# Patient Record
Sex: FEMALE | Race: BLACK OR AFRICAN AMERICAN | Employment: OTHER | URBAN - METROPOLITAN AREA
[De-identification: names, ages, dates, MRNs, and addresses within clinical notes are randomized per-mention and may not be internally consistent; named-entity substitution may affect disease eponyms.]

---

## 2023-08-31 PROBLEM — I10 BENIGN ESSENTIAL HYPERTENSION: Status: ACTIVE | Noted: 2023-08-31

## 2023-08-31 PROBLEM — H04.123 DRY EYE SYNDROME, BILATERAL: Status: ACTIVE | Noted: 2023-08-31

## 2023-08-31 PROBLEM — H40.1131 PRIMARY OPEN-ANGLE GLAUCOMA, BILATERAL, MILD STAGE: Status: ACTIVE | Noted: 2023-08-31

## 2023-08-31 PROBLEM — M47.812 CERVICAL SPONDYLOSIS WITHOUT MYELOPATHY: Status: ACTIVE | Noted: 2023-08-31

## 2023-08-31 PROBLEM — J45.40 ASTHMA, MODERATE PERSISTENT (HHS-HCC): Status: ACTIVE | Noted: 2023-08-31

## 2023-08-31 PROBLEM — M79.18 MYOFASCIAL PAIN: Status: ACTIVE | Noted: 2023-08-31

## 2023-08-31 PROBLEM — R21 FACIAL RASH: Status: ACTIVE | Noted: 2023-08-31

## 2023-08-31 PROBLEM — L30.9 DERMATITIS, UNSPECIFIED: Status: ACTIVE | Noted: 2020-07-21

## 2023-08-31 PROBLEM — E78.5 HYPERLIPIDEMIA: Status: ACTIVE | Noted: 2023-08-31

## 2023-08-31 PROBLEM — H40.033 ANATOMICAL NARROW ANGLE GLAUCOMA, BILATERAL: Status: ACTIVE | Noted: 2023-08-31

## 2023-08-31 PROBLEM — D50.9 ANEMIA, IRON DEFICIENCY: Status: ACTIVE | Noted: 2023-08-31

## 2023-08-31 PROBLEM — H35.373 EPIRETINAL MEMBRANE (ERM), BILATERAL: Status: ACTIVE | Noted: 2023-08-31

## 2023-08-31 PROBLEM — L57.9 SKIN CHANGES DUE TO CHRONIC EXPOSURE TO NONIONIZING RADIATION, UNSPECIFIED: Status: ACTIVE | Noted: 2020-07-21

## 2023-08-31 PROBLEM — H25.13 CATARACT, NUCLEAR SCLEROTIC, BOTH EYES: Status: ACTIVE | Noted: 2023-08-31

## 2023-08-31 PROBLEM — M54.12 CERVICAL RADICULITIS: Status: ACTIVE | Noted: 2023-08-31

## 2023-08-31 PROBLEM — H43.393 VITREOUS OPACITIES OF BOTH EYES: Status: ACTIVE | Noted: 2023-08-31

## 2023-08-31 PROBLEM — L85.3 XEROSIS CUTIS: Status: ACTIVE | Noted: 2020-07-21

## 2023-08-31 RX ORDER — ALBUTEROL SULFATE 90 UG/1
1-2 AEROSOL, METERED RESPIRATORY (INHALATION)
COMMUNITY
Start: 2019-03-01

## 2023-08-31 RX ORDER — KETOCONAZOLE 20 MG/ML
SHAMPOO, SUSPENSION TOPICAL
COMMUNITY
Start: 2022-09-15

## 2023-08-31 RX ORDER — AMLODIPINE BESYLATE 2.5 MG/1
1 TABLET ORAL DAILY
COMMUNITY
Start: 2019-03-01

## 2023-08-31 RX ORDER — FLUOCINOLONE ACETONIDE 0.25 MG/G
OINTMENT TOPICAL 2 TIMES DAILY
COMMUNITY
Start: 2023-07-26

## 2023-08-31 RX ORDER — MINOXIDIL 2.5 MG/1
TABLET ORAL
COMMUNITY
Start: 2023-07-31

## 2023-08-31 RX ORDER — HYDROCODONE BITARTRATE AND ACETAMINOPHEN 5; 325 MG/1; MG/1
1 TABLET ORAL EVERY 6 HOURS PRN
COMMUNITY
Start: 2019-09-09 | End: 2023-11-13 | Stop reason: ALTCHOICE

## 2023-08-31 RX ORDER — IBUPROFEN 600 MG/1
1 TABLET ORAL 3 TIMES DAILY
COMMUNITY
Start: 2019-09-09

## 2023-08-31 RX ORDER — FLUTICASONE PROPIONATE 44 UG/1
2 AEROSOL, METERED RESPIRATORY (INHALATION) EVERY 12 HOURS
COMMUNITY
Start: 2019-03-01

## 2023-08-31 RX ORDER — CYCLOBENZAPRINE HCL 10 MG
1 TABLET ORAL NIGHTLY PRN
COMMUNITY
Start: 2019-09-09

## 2023-08-31 RX ORDER — FAMOTIDINE 20 MG/1
1 TABLET, FILM COATED ORAL DAILY
COMMUNITY
Start: 2021-09-10 | End: 2023-11-13 | Stop reason: ALTCHOICE

## 2023-08-31 RX ORDER — AMLODIPINE BESYLATE 5 MG/1
1 TABLET ORAL DAILY
COMMUNITY
Start: 2019-09-09 | End: 2023-11-13 | Stop reason: ALTCHOICE

## 2023-08-31 RX ORDER — POTASSIUM CHLORIDE 750 MG/1
1 TABLET, FILM COATED, EXTENDED RELEASE ORAL DAILY
COMMUNITY
Start: 2019-03-01 | End: 2023-11-13 | Stop reason: ALTCHOICE

## 2023-08-31 RX ORDER — LISINOPRIL AND HYDROCHLOROTHIAZIDE 20; 25 MG/1; MG/1
1 TABLET ORAL DAILY
COMMUNITY
Start: 2019-09-09

## 2023-08-31 RX ORDER — AMLODIPINE BESYLATE 10 MG/1
TABLET ORAL
COMMUNITY
Start: 2022-10-12 | End: 2023-11-13 | Stop reason: ALTCHOICE

## 2023-08-31 RX ORDER — LATANOPROST/PF 0.005 %
1 DROPS OPHTHALMIC (EYE) NIGHTLY
COMMUNITY
Start: 2020-01-28 | End: 2024-01-11 | Stop reason: SDUPTHER

## 2023-08-31 RX ORDER — DESONIDE 0.5 MG/G
CREAM TOPICAL 2 TIMES DAILY PRN
COMMUNITY
Start: 2020-12-22

## 2023-08-31 RX ORDER — THEOPHYLLINE 300 MG/1
1 TABLET, EXTENDED RELEASE ORAL DAILY
COMMUNITY
Start: 2019-03-01

## 2023-08-31 RX ORDER — PREDNISONE 20 MG/1
TABLET ORAL
COMMUNITY
Start: 2021-09-10 | End: 2023-11-13 | Stop reason: ALTCHOICE

## 2023-10-24 ENCOUNTER — OFFICE VISIT (OUTPATIENT)
Dept: OPHTHALMOLOGY | Facility: CLINIC | Age: 73
End: 2023-10-24
Payer: MEDICARE

## 2023-10-24 DIAGNOSIS — H25.13 CATARACT, NUCLEAR SCLEROTIC, BOTH EYES: Primary | ICD-10-CM

## 2023-10-24 DIAGNOSIS — H40.033 ANATOMICAL NARROW ANGLE GLAUCOMA, BILATERAL: ICD-10-CM

## 2023-10-24 PROCEDURE — 92136 OPHTHALMIC BIOMETRY: CPT | Mod: BILATERAL PROCEDURE | Performed by: OPHTHALMOLOGY

## 2023-10-24 PROCEDURE — 99214 OFFICE O/P EST MOD 30 MIN: CPT | Performed by: OPHTHALMOLOGY

## 2023-10-24 PROCEDURE — 92136 OPHTHALMIC BIOMETRY: CPT | Performed by: OPHTHALMOLOGY

## 2023-10-24 RX ORDER — ATORVASTATIN CALCIUM 20 MG/1
20 TABLET, FILM COATED ORAL DAILY
COMMUNITY

## 2023-10-24 RX ORDER — METHYLPREDNISOLONE 4 MG/1
4 TABLET ORAL DAILY
COMMUNITY
End: 2023-11-13 | Stop reason: ALTCHOICE

## 2023-10-24 RX ORDER — MOMETASONE FUROATE 220 UG/1
2 INHALANT RESPIRATORY (INHALATION)
COMMUNITY
End: 2023-11-13 | Stop reason: ALTCHOICE

## 2023-10-24 RX ORDER — HYDROCORTISONE 25 MG/G
1 CREAM TOPICAL 2 TIMES DAILY
COMMUNITY

## 2023-10-24 ASSESSMENT — TONOMETRY
IOP_METHOD: GOLDMANN APPLANATION
OD_IOP_MMHG: 25
OS_IOP_MMHG: 23

## 2023-10-24 ASSESSMENT — SLIT LAMP EXAM - LIDS
COMMENTS: NORMAL
COMMENTS: NORMAL

## 2023-10-24 ASSESSMENT — REFRACTION_WEARINGRX
OS_AXIS: 100
OD_SPHERE: -4.50
OD_CYLINDER: -1.00
OS_CYLINDER: -1.00
OS_SPHERE: -5.50
OD_ADD: +2.75
OD_AXIS: 096
OS_ADD: +2.75

## 2023-10-24 ASSESSMENT — CONF VISUAL FIELD
OD_SUPERIOR_TEMPORAL_RESTRICTION: 0
OS_SUPERIOR_TEMPORAL_RESTRICTION: 0
OD_NORMAL: 1
OD_INFERIOR_NASAL_RESTRICTION: 0
OS_SUPERIOR_NASAL_RESTRICTION: 0
OS_NORMAL: 1
METHOD: COUNTING FINGERS
OD_INFERIOR_TEMPORAL_RESTRICTION: 0
OD_SUPERIOR_NASAL_RESTRICTION: 0
OS_INFERIOR_TEMPORAL_RESTRICTION: 0
OS_INFERIOR_NASAL_RESTRICTION: 0

## 2023-10-24 ASSESSMENT — VISUAL ACUITY
METHOD: SNELLEN - LINEAR
OD_CC: 20/25-2
CORRECTION_TYPE: GLASSES
OS_CC: 20/60

## 2023-10-24 ASSESSMENT — ENCOUNTER SYMPTOMS: EYES NEGATIVE: 1

## 2023-10-24 ASSESSMENT — PACHYMETRY
OD_CT(UM): 588
OS_CT(UM): 576

## 2023-10-24 ASSESSMENT — EXTERNAL EXAM - RIGHT EYE: OD_EXAM: NORMAL

## 2023-10-24 ASSESSMENT — CUP TO DISC RATIO
OD_RATIO: 0.5
OS_RATIO: 0.75

## 2023-10-24 ASSESSMENT — EXTERNAL EXAM - LEFT EYE: OS_EXAM: NORMAL

## 2023-10-24 NOTE — PROGRESS NOTES
1.. chronic angle closure glaucoma, mild stage, bilateral  - s/p LPI OU  -nerves with some cupping but no definitive VF defect OU  IOP higher today while on latan qhs oU  given below recommend combining with canaloplasty to reduce drop burden/improved IOP control    2. Cataract, nuclear sclerotic, both eyes  74 yo patient presents with visually significant cataract of left>right eye affecting activities of daily living including using her firearm. It is believed removal of the cataracts will improve vision adn thus qualuty of life. After discussing r/b/a to surgery the patient elected to proceed left eye first hten right. Lens options were discussed including pros/cons/and eligibility for monofocal, toric, multifocal, and toric multifocal lenses and patient elected to proceed with monofocal. patient`s current mrx is myopic. target after surgery will be plano. The patient was told to continue all medications through surgery.  anesthesia will planned to be mac with topical. will combine with abic

## 2023-10-30 ENCOUNTER — PREP FOR PROCEDURE (OUTPATIENT)
Dept: OPHTHALMOLOGY | Facility: CLINIC | Age: 73
End: 2023-10-30
Payer: MEDICARE

## 2023-10-30 DIAGNOSIS — H40.2231 CHRONIC ANGLE-CLOSURE GLAUCOMA OF BOTH EYES, MILD STAGE: ICD-10-CM

## 2023-10-30 DIAGNOSIS — H25.13 AGE-RELATED NUCLEAR CATARACT OF BOTH EYES: Primary | ICD-10-CM

## 2023-10-30 RX ORDER — TROPICAMIDE 10 MG/ML
1 SOLUTION/ DROPS OPHTHALMIC
Status: CANCELLED | OUTPATIENT
Start: 2023-10-30 | End: 2023-10-30

## 2023-10-30 RX ORDER — MOXIFLOXACIN 5 MG/ML
1 SOLUTION/ DROPS OPHTHALMIC
Status: CANCELLED | OUTPATIENT
Start: 2023-10-30 | End: 2023-10-30

## 2023-10-30 RX ORDER — CYCLOPENTOLATE HYDROCHLORIDE 10 MG/ML
1 SOLUTION/ DROPS OPHTHALMIC
Status: CANCELLED | OUTPATIENT
Start: 2023-10-30 | End: 2023-10-30

## 2023-10-30 RX ORDER — PHENYLEPHRINE HYDROCHLORIDE 25 MG/ML
1 SOLUTION/ DROPS OPHTHALMIC
Status: CANCELLED | OUTPATIENT
Start: 2023-10-30 | End: 2023-10-30

## 2023-10-30 NOTE — H&P (VIEW-ONLY)
History Of Present Illness  Clarissa Boo is a 73 y.o. female presenting for surgery.     Past Medical History  She has a past medical history of Cataract and Glaucoma.    Surgical History  She has a past surgical history that includes Other surgical history (03/01/2019) and Other surgical history (03/01/2019).     Social History  She reports that she has never smoked. She does not have any smokeless tobacco history on file. No history on file for alcohol use and drug use.     Allergies  Aspirin    ROS  Patient denies ocular pain, redness, discharge, decreased vision, double vision, blind spots, flashes, or floaters.     Physical Exam  Not recorded          Assessment/Plan   See clinic notes for details of plan      Ani Sheehan MD

## 2023-10-31 ENCOUNTER — ANCILLARY PROCEDURE (OUTPATIENT)
Dept: RADIOLOGY | Facility: CLINIC | Age: 73
End: 2023-10-31
Payer: OTHER MISCELLANEOUS

## 2023-10-31 ENCOUNTER — APPOINTMENT (OUTPATIENT)
Dept: RADIOLOGY | Facility: CLINIC | Age: 73
End: 2023-10-31
Payer: COMMERCIAL

## 2023-10-31 DIAGNOSIS — S93.402A SPRAIN OF UNSPECIFIED LIGAMENT OF LEFT ANKLE, INITIAL ENCOUNTER: ICD-10-CM

## 2023-10-31 PROCEDURE — 73721 MRI JNT OF LWR EXTRE W/O DYE: CPT | Mod: LEFT SIDE | Performed by: RADIOLOGY

## 2023-10-31 PROCEDURE — 73721 MRI JNT OF LWR EXTRE W/O DYE: CPT | Mod: LT,MG

## 2023-11-27 ENCOUNTER — ANESTHESIA EVENT (OUTPATIENT)
Dept: OPERATING ROOM | Facility: CLINIC | Age: 73
End: 2023-11-27
Payer: MEDICARE

## 2023-11-27 ENCOUNTER — HOSPITAL ENCOUNTER (OUTPATIENT)
Facility: CLINIC | Age: 73
Setting detail: OUTPATIENT SURGERY
Discharge: HOME | End: 2023-11-27
Attending: OPHTHALMOLOGY | Admitting: OPHTHALMOLOGY
Payer: MEDICARE

## 2023-11-27 ENCOUNTER — ANESTHESIA (OUTPATIENT)
Dept: OPERATING ROOM | Facility: CLINIC | Age: 73
End: 2023-11-27
Payer: MEDICARE

## 2023-11-27 VITALS
TEMPERATURE: 96.8 F | SYSTOLIC BLOOD PRESSURE: 155 MMHG | BODY MASS INDEX: 28.6 KG/M2 | RESPIRATION RATE: 17 BRPM | HEIGHT: 64 IN | OXYGEN SATURATION: 100 % | DIASTOLIC BLOOD PRESSURE: 80 MMHG | HEART RATE: 80 BPM | WEIGHT: 167.55 LBS

## 2023-11-27 DIAGNOSIS — H25.13 AGE-RELATED NUCLEAR CATARACT OF BOTH EYES: Primary | ICD-10-CM

## 2023-11-27 DIAGNOSIS — H40.2231 CHRONIC ANGLE-CLOSURE GLAUCOMA OF BOTH EYES, MILD STAGE: ICD-10-CM

## 2023-11-27 PROCEDURE — 94760 N-INVAS EAR/PLS OXIMETRY 1: CPT | Mod: CCI

## 2023-11-27 PROCEDURE — 3600000008 HC OR TIME - EACH INCREMENTAL 1 MINUTE - PROCEDURE LEVEL THREE: Performed by: OPHTHALMOLOGY

## 2023-11-27 PROCEDURE — 99100 ANES PT EXTEME AGE<1 YR&>70: CPT | Performed by: ANESTHESIOLOGY

## 2023-11-27 PROCEDURE — A65820 PR RELIEVE INNER EYE PRESSURE: Performed by: ANESTHESIOLOGY

## 2023-11-27 PROCEDURE — 3700000002 HC GENERAL ANESTHESIA TIME - EACH INCREMENTAL 1 MINUTE: Performed by: OPHTHALMOLOGY

## 2023-11-27 PROCEDURE — 2500000004 HC RX 250 GENERAL PHARMACY W/ HCPCS (ALT 636 FOR OP/ED)

## 2023-11-27 PROCEDURE — A65820 PR RELIEVE INNER EYE PRESSURE

## 2023-11-27 PROCEDURE — 7100000010 HC PHASE TWO TIME - EACH INCREMENTAL 1 MINUTE: Performed by: OPHTHALMOLOGY

## 2023-11-27 PROCEDURE — 65820 GONIOTOMY: CPT | Performed by: OPHTHALMOLOGY

## 2023-11-27 PROCEDURE — 66984 XCAPSL CTRC RMVL W/O ECP: CPT | Performed by: OPHTHALMOLOGY

## 2023-11-27 PROCEDURE — 2500000005 HC RX 250 GENERAL PHARMACY W/O HCPCS: Performed by: OPHTHALMOLOGY

## 2023-11-27 PROCEDURE — 3700000001 HC GENERAL ANESTHESIA TIME - INITIAL BASE CHARGE: Performed by: OPHTHALMOLOGY

## 2023-11-27 PROCEDURE — 2500000001 HC RX 250 WO HCPCS SELF ADMINISTERED DRUGS (ALT 637 FOR MEDICARE OP): Performed by: OPHTHALMOLOGY

## 2023-11-27 PROCEDURE — 2500000004 HC RX 250 GENERAL PHARMACY W/ HCPCS (ALT 636 FOR OP/ED): Performed by: OPHTHALMOLOGY

## 2023-11-27 PROCEDURE — 3600000003 HC OR TIME - INITIAL BASE CHARGE - PROCEDURE LEVEL THREE: Performed by: OPHTHALMOLOGY

## 2023-11-27 PROCEDURE — 7100000009 HC PHASE TWO TIME - INITIAL BASE CHARGE: Performed by: OPHTHALMOLOGY

## 2023-11-27 PROCEDURE — 2760000001 HC OR 276 NO HCPCS: Performed by: OPHTHALMOLOGY

## 2023-11-27 PROCEDURE — 2720000007 HC OR 272 NO HCPCS: Performed by: OPHTHALMOLOGY

## 2023-11-27 PROCEDURE — 2500000001 HC RX 250 WO HCPCS SELF ADMINISTERED DRUGS (ALT 637 FOR MEDICARE OP)

## 2023-11-27 DEVICE — CANALOPLASTY MICROCATHETER KITKIT CONTENTS:(1) CANALOPLASTY MICROCATHETER ITRACK 250A(1) OPTHALMIC VISCOINJECTOR
Type: IMPLANTABLE DEVICE | Site: EYE | Status: FUNCTIONAL
Brand: ITRACK 250A

## 2023-11-27 DEVICE — ACRYSOF(R) IQ ASPHERIC NATURAL IOL, SINGLE-PIECE ACRYLIC FOLDABLE PCL, UV WITH BLUE LIGHTFILTER, 13.0MM LENGTH, 6.0MM ANTERIORASYMMETRIC BICONVEX OPTIC, PLANAR HAPTICS.
Type: IMPLANTABLE DEVICE | Site: EYE | Status: FUNCTIONAL
Brand: ACRYSOF®

## 2023-11-27 RX ORDER — LIDOCAINE IN NACL,ISO-OSMOT/PF 30 MG/3 ML
0.1 SYRINGE (ML) INJECTION ONCE
Status: DISCONTINUED | OUTPATIENT
Start: 2023-11-27 | End: 2023-11-27 | Stop reason: HOSPADM

## 2023-11-27 RX ORDER — ALBUTEROL SULFATE 0.83 MG/ML
2.5 SOLUTION RESPIRATORY (INHALATION) ONCE AS NEEDED
Status: DISCONTINUED | OUTPATIENT
Start: 2023-11-27 | End: 2023-11-27 | Stop reason: HOSPADM

## 2023-11-27 RX ORDER — DICLOFENAC SODIUM 1 MG/ML
SOLUTION/ DROPS OPHTHALMIC AS NEEDED
Status: DISCONTINUED | OUTPATIENT
Start: 2023-11-27 | End: 2023-11-27 | Stop reason: HOSPADM

## 2023-11-27 RX ORDER — PREDNISOLONE ACETATE 10 MG/ML
SUSPENSION/ DROPS OPHTHALMIC AS NEEDED
Status: DISCONTINUED | OUTPATIENT
Start: 2023-11-27 | End: 2023-11-27 | Stop reason: HOSPADM

## 2023-11-27 RX ORDER — ACETAMINOPHEN 325 MG/1
TABLET ORAL AS NEEDED
Status: DISCONTINUED | OUTPATIENT
Start: 2023-11-27 | End: 2023-11-27

## 2023-11-27 RX ORDER — TETRACAINE HYDROCHLORIDE 5 MG/ML
SOLUTION OPHTHALMIC AS NEEDED
Status: DISCONTINUED | OUTPATIENT
Start: 2023-11-27 | End: 2023-11-27 | Stop reason: HOSPADM

## 2023-11-27 RX ORDER — PHENYLEPHRINE HYDROCHLORIDE 25 MG/ML
1 SOLUTION/ DROPS OPHTHALMIC
Status: COMPLETED | OUTPATIENT
Start: 2023-11-27 | End: 2023-11-27

## 2023-11-27 RX ORDER — SODIUM CHLORIDE, SODIUM LACTATE, POTASSIUM CHLORIDE, CALCIUM CHLORIDE 600; 310; 30; 20 MG/100ML; MG/100ML; MG/100ML; MG/100ML
100 INJECTION, SOLUTION INTRAVENOUS CONTINUOUS
Status: DISCONTINUED | OUTPATIENT
Start: 2023-11-27 | End: 2023-11-27 | Stop reason: HOSPADM

## 2023-11-27 RX ORDER — MOXIFLOXACIN 5 MG/ML
SOLUTION/ DROPS OPHTHALMIC AS NEEDED
Status: DISCONTINUED | OUTPATIENT
Start: 2023-11-27 | End: 2023-11-27 | Stop reason: HOSPADM

## 2023-11-27 RX ORDER — MIDAZOLAM HYDROCHLORIDE 1 MG/ML
INJECTION, SOLUTION INTRAMUSCULAR; INTRAVENOUS AS NEEDED
Status: DISCONTINUED | OUTPATIENT
Start: 2023-11-27 | End: 2023-11-27

## 2023-11-27 RX ORDER — CYCLOPENTOLATE HYDROCHLORIDE 10 MG/ML
1 SOLUTION/ DROPS OPHTHALMIC
Status: COMPLETED | OUTPATIENT
Start: 2023-11-27 | End: 2023-11-27

## 2023-11-27 RX ORDER — ONDANSETRON HYDROCHLORIDE 2 MG/ML
4 INJECTION, SOLUTION INTRAVENOUS ONCE AS NEEDED
Status: DISCONTINUED | OUTPATIENT
Start: 2023-11-27 | End: 2023-11-27 | Stop reason: HOSPADM

## 2023-11-27 RX ORDER — EPINEPHRINE 1 MG/ML
INJECTION, SOLUTION, CONCENTRATE INTRAVENOUS AS NEEDED
Status: DISCONTINUED | OUTPATIENT
Start: 2023-11-27 | End: 2023-11-27 | Stop reason: HOSPADM

## 2023-11-27 RX ORDER — MOXIFLOXACIN 5 MG/ML
1 SOLUTION/ DROPS OPHTHALMIC
Status: COMPLETED | OUTPATIENT
Start: 2023-11-27 | End: 2023-11-27

## 2023-11-27 RX ORDER — FENTANYL CITRATE 50 UG/ML
INJECTION, SOLUTION INTRAMUSCULAR; INTRAVENOUS AS NEEDED
Status: DISCONTINUED | OUTPATIENT
Start: 2023-11-27 | End: 2023-11-27

## 2023-11-27 RX ORDER — TROPICAMIDE 10 MG/ML
1 SOLUTION/ DROPS OPHTHALMIC
Status: COMPLETED | OUTPATIENT
Start: 2023-11-27 | End: 2023-11-27

## 2023-11-27 RX ORDER — LIDOCAINE HYDROCHLORIDE 20 MG/ML
INJECTION, SOLUTION INFILTRATION; PERINEURAL AS NEEDED
Status: DISCONTINUED | OUTPATIENT
Start: 2023-11-27 | End: 2023-11-27 | Stop reason: HOSPADM

## 2023-11-27 RX ADMIN — CYCLOPENTOLATE HYDROCHLORIDE 1 DROP: 10 SOLUTION/ DROPS OPHTHALMIC at 10:35

## 2023-11-27 RX ADMIN — TROPICAMIDE 1 DROP: 10 SOLUTION/ DROPS OPHTHALMIC at 10:30

## 2023-11-27 RX ADMIN — FENTANYL CITRATE 25 MCG: 50 INJECTION, SOLUTION INTRAMUSCULAR; INTRAVENOUS at 11:05

## 2023-11-27 RX ADMIN — TROPICAMIDE 1 DROP: 10 SOLUTION/ DROPS OPHTHALMIC at 10:35

## 2023-11-27 RX ADMIN — CYCLOPENTOLATE HYDROCHLORIDE 1 DROP: 10 SOLUTION/ DROPS OPHTHALMIC at 10:40

## 2023-11-27 RX ADMIN — TROPICAMIDE 1 DROP: 10 SOLUTION/ DROPS OPHTHALMIC at 10:40

## 2023-11-27 RX ADMIN — CYCLOPENTOLATE HYDROCHLORIDE 1 DROP: 10 SOLUTION/ DROPS OPHTHALMIC at 10:30

## 2023-11-27 RX ADMIN — MOXIFLOXACIN 1 DROP: 5 SOLUTION/ DROPS OPHTHALMIC at 10:40

## 2023-11-27 RX ADMIN — PHENYLEPHRINE HYDROCHLORIDE 1 DROP: 25 SOLUTION/ DROPS OPHTHALMIC at 10:30

## 2023-11-27 RX ADMIN — MOXIFLOXACIN 1 DROP: 5 SOLUTION/ DROPS OPHTHALMIC at 10:30

## 2023-11-27 RX ADMIN — PHENYLEPHRINE HYDROCHLORIDE 1 DROP: 25 SOLUTION/ DROPS OPHTHALMIC at 10:40

## 2023-11-27 RX ADMIN — ACETAMINOPHEN 975 MG: 325 TABLET ORAL at 10:30

## 2023-11-27 RX ADMIN — SODIUM CHLORIDE, SODIUM LACTATE, POTASSIUM CHLORIDE, AND CALCIUM CHLORIDE: .6; .31; .03; .02 INJECTION, SOLUTION INTRAVENOUS at 11:03

## 2023-11-27 RX ADMIN — MOXIFLOXACIN 1 DROP: 5 SOLUTION/ DROPS OPHTHALMIC at 10:35

## 2023-11-27 RX ADMIN — PHENYLEPHRINE HYDROCHLORIDE 1 DROP: 25 SOLUTION/ DROPS OPHTHALMIC at 10:35

## 2023-11-27 RX ADMIN — MIDAZOLAM 1 MG: 1 INJECTION INTRAMUSCULAR; INTRAVENOUS at 11:05

## 2023-11-27 SDOH — HEALTH STABILITY: MENTAL HEALTH: CURRENT SMOKER: 0

## 2023-11-27 ASSESSMENT — PAIN SCALES - GENERAL
PAIN_LEVEL: 0
PAINLEVEL_OUTOF10: 1
PAINLEVEL_OUTOF10: 0 - NO PAIN

## 2023-11-27 ASSESSMENT — PAIN - FUNCTIONAL ASSESSMENT
PAIN_FUNCTIONAL_ASSESSMENT: 0-10

## 2023-11-27 ASSESSMENT — COLUMBIA-SUICIDE SEVERITY RATING SCALE - C-SSRS
2. HAVE YOU ACTUALLY HAD ANY THOUGHTS OF KILLING YOURSELF?: NO
1. IN THE PAST MONTH, HAVE YOU WISHED YOU WERE DEAD OR WISHED YOU COULD GO TO SLEEP AND NOT WAKE UP?: NO
6. HAVE YOU EVER DONE ANYTHING, STARTED TO DO ANYTHING, OR PREPARED TO DO ANYTHING TO END YOUR LIFE?: NO

## 2023-11-27 ASSESSMENT — PAIN DESCRIPTION - DESCRIPTORS: DESCRIPTORS: OTHER (COMMENT)

## 2023-11-27 NOTE — DISCHARGE INSTRUCTIONS
May have Tylenol after: 4:30 PM    May have Ibuprofen/advil/motrin/aleve after: when MD approves     Do not use Glaucoma drops in left eye until directed to by Dr Sheehan

## 2023-11-27 NOTE — ANESTHESIA PREPROCEDURE EVALUATION
Patient: Clarissa Boo    Procedure Information       Date/Time: 11/27/23 1245    Procedure: PHACO OS, GONIOTOMY (Left)    Location: [facility] OR 03 / [facility]    Surgeons: Ani Sheehan MD            Relevant Problems   Anesthesia (within normal limits)      Cardiovascular   (+) Benign essential hypertension   (+) Hyperlipidemia      Endocrine (within normal limits)      GI (within normal limits)      /Renal (within normal limits)      Neuro/Psych   (+) Cervical radiculitis      Pulmonary   (+) Asthma, moderate persistent      GI/Hepatic (within normal limits)      Hematology   (+) Anemia, iron deficiency      Musculoskeletal   (+) Cervical spondylosis without myelopathy      Eyes, Ears, Nose, and Throat   (+) Anatomical narrow angle glaucoma, bilateral   (+) Chronic angle-closure glaucoma of both eyes, mild stage   (+) Primary open-angle glaucoma, bilateral, mild stage       Clinical information reviewed:    Allergies  Meds               NPO Detail:  NPO/Void Status  Date of Last Liquid: 11/26/23  Time of Last Liquid: 1800  Date of Last Solid: 11/26/23  Time of Last Solid: 1800         Physical Exam    Airway  Mallampati: II  TM distance: >3 FB  Neck ROM: full     Cardiovascular - normal exam     Dental - normal exam  (+) upper dentures, lower dentures     Pulmonary - normal exam     Abdominal - normal exam             Anesthesia Plan    ASA 2     MAC     The patient is not a current smoker.  Patient was not previously instructed to abstain from smoking on day of procedure.  Patient did not smoke on day of procedure.    intravenous induction   Anesthetic plan and risks discussed with patient.  Use of blood products discussed with patient who.    Plan discussed with CAA, attending and resident.

## 2023-11-27 NOTE — OP NOTE
Cataract Phacoemulsification w/I0L (L) Operative Note     Date: 2023  [place] [facility] [place]    Name: Clarissa Boo : 1950, Age: 73 y.o., MRN: [id number], Sex: female    Diagnosis  Pre-op Diagnosis     * Age-related nuclear cataract of both eyes [H25.13]     * Chronic angle-closure glaucoma of both eyes, mild stage [H40.2231] Post-op Diagnosis     * Age-related nuclear cataract of both eyes [H25.13]     * Chronic angle-closure glaucoma of both eyes, mild stage [H40.2231]     Procedures  PHACO OS, GONIOTOMY  26366 - [place] XCAPSL CTRC RMVL INSJ IO LENS PROSTH W/O ECP    PHACO OS, GONIOTOMY  56971 - [place] GONIOTOMY  , LEFT    Surgeons      * Ani Sheehan - Primary    Resident/Fellow/Other Assistant:  Kristen    Procedure Summary  Anesthesia: Monitor Anesthesia Care  ASA: II  Anesthesia Staff:   Anesthesiologist: Katlyn Price DO  C-AA: KENNY Stewart  Estimated Blood Loss: none        Specimen: No specimens collected     Staff:   Circulator: Rachel Sampson RN  Relief Scrub: Mansi Hill RN  Scrub Person: Jennifer Cornejo          Findings: as expected    Indications: Clarissa Boo is an 73 y.o. female who is having surgery for Age-related nuclear cataract of both eyes [H25.13]  Chronic angle-closure glaucoma of both eyes, mild stage [H40.2231]. LEFT    The patient was seen in the preoperative area. The risks, benefits, complications, treatment options, non-operative alternatives, expected recovery and outcomes were discussed with the patient. The possibilities of reaction to medication, pulmonary aspiration, injury to surrounding structures, bleeding, recurrent infection, the need for additional procedures, failure to diagnose a condition, and creating a complication or operation were discussed with the patient. The patient concurred with the proposed plan, giving informed consent.  The site of surgery was properly noted/marked if necessary per policy.     Procedure  Details:   The patient was escorted to the operating room where a time out was completed   and monitored anesthesia care was begun. The patient was prepped and draped in   the usual sterile fashion for intraocular surgery. A lid speculum was placed   and the operating microscope was positioned. A paracentesis was made to the   left of the planned cataract incision with a 1mm blade.  Shugarcaine followed by Viscoat was used to   replace the aqueous humor. A temporal clear corneal wound was made with a 2.4   mm keratome, extending 2 mm into clear cornea before entering the anterior   chamber. A continuous curvilinear  capsulorhexis of approximately 5 mm in   diameter was performed. Hydrodissection was performed using a canula. The   pre-chopper was used to crack the nucleus into smaller pieces which were then   removed with the assistance of a horizontal chopper and phaco hand piece.   Residual cortex was removed with IA. ProVisc was used to inflate the capsular   bag. The lens implant  alex [id number] 13.5 diopter intraocular lens. The lens was   inserted into the capsular bag and rotated to the proper position with a   juno. The patient was then positioned for direct gonioscopy. A hour 3 clock hour nasal goniotomy was made.   The microcatheter was primed and then thread for 360 degrees and then retracted whilst   injecting viscoelastic approximately 8-10 clock hours per quadrant. the device   was then removed an dthe patietn was repositioned supine. Residual   viscoelastic was removed from the eye with the irrigation/aspiration   instrument. The wounds were checked and found to be watertight. The anterior   chamber was at physiologic depth and pressure. The lid speculum was removed and   a patch and shield were taped in place. The patient tolerated the procedure   well, and there were no complications.   Complications:  None   Disposition: stable          Attending Attestation: I was present and scrubbed for the  entire procedure    Ani Sheehan  Phone Number: [phone]

## 2023-11-27 NOTE — ANESTHESIA POSTPROCEDURE EVALUATION
Patient: Clarissa Boo    Procedure Summary       Date: 11/27/23 Room / Location: [facility] OR 03 / [facility]    Anesthesia Start: 1103 Anesthesia Stop: 1155    Procedure: PHACO OS, GONIOTOMY (Left) Diagnosis:       Age-related nuclear cataract of both eyes      Chronic angle-closure glaucoma of both eyes, mild stage      (Age-related nuclear cataract of both eyes [H25.13])      (Chronic angle-closure glaucoma of both eyes, mild stage [H40.2231])    Surgeons: Ani Sheehan MD Responsible Provider: Katlyn Price DO    Anesthesia Type: MAC ASA Status: 2            Anesthesia Type: MAC    Vitals Value Taken Time   /82 11/27/23 1155   Temp 36.5 11/27/23 1155   Pulse 70 11/27/23 1155   Resp 14 11/27/23 1155   SpO2 98 11/27/23 1155       Anesthesia Post Evaluation    Patient location during evaluation: PACU  Patient participation: complete - patient participated  Level of consciousness: awake  Pain score: 0  Pain management: adequate  Multimodal analgesia pain management approach  Airway patency: patent  There was medical reason for not screening for obstructive sleep apnea and/or not using of two or more mitigation strategies.Cardiovascular status: acceptable, hemodynamically stable and blood pressure returned to baseline  Respiratory status: acceptable and room air  Hydration status: acceptable  Postoperative Nausea and Vomiting: none      No notable events documented.

## 2023-11-28 ENCOUNTER — OFFICE VISIT (OUTPATIENT)
Dept: OPHTHALMOLOGY | Facility: CLINIC | Age: 73
End: 2023-11-28
Payer: MEDICARE

## 2023-11-28 DIAGNOSIS — H40.033 ANATOMICAL NARROW ANGLE GLAUCOMA, BILATERAL: ICD-10-CM

## 2023-11-28 DIAGNOSIS — H25.13 CATARACT, NUCLEAR SCLEROTIC, BOTH EYES: Primary | ICD-10-CM

## 2023-11-28 PROCEDURE — 99024 POSTOP FOLLOW-UP VISIT: CPT | Performed by: OPHTHALMOLOGY

## 2023-11-28 ASSESSMENT — SLIT LAMP EXAM - LIDS
COMMENTS: NORMAL
COMMENTS: NORMAL

## 2023-11-28 ASSESSMENT — ENCOUNTER SYMPTOMS
EYES NEGATIVE: 0
CARDIOVASCULAR NEGATIVE: 0
RESPIRATORY NEGATIVE: 0
NEUROLOGICAL NEGATIVE: 0
ENDOCRINE NEGATIVE: 0
PSYCHIATRIC NEGATIVE: 0
ALLERGIC/IMMUNOLOGIC NEGATIVE: 0
HEMATOLOGIC/LYMPHATIC NEGATIVE: 0
CONSTITUTIONAL NEGATIVE: 0
MUSCULOSKELETAL NEGATIVE: 0
GASTROINTESTINAL NEGATIVE: 0

## 2023-11-28 ASSESSMENT — VISUAL ACUITY
METHOD: SNELLEN - LINEAR
OS_SC: 20/70
OS_SC+: +2

## 2023-11-28 ASSESSMENT — PACHYMETRY
OD_CT(UM): 588
OS_CT(UM): 576

## 2023-11-28 ASSESSMENT — EXTERNAL EXAM - RIGHT EYE: OD_EXAM: NORMAL

## 2023-11-28 ASSESSMENT — PAIN SCALES - GENERAL: PAINLEVEL_OUTOF10: 0 - NO PAIN

## 2023-11-28 ASSESSMENT — TONOMETRY
OS_IOP_MMHG: 15
IOP_METHOD: GOLDMANN APPLANATION

## 2023-11-28 ASSESSMENT — EXTERNAL EXAM - LEFT EYE: OS_EXAM: NORMAL

## 2023-11-28 NOTE — PROGRESS NOTES
POD1 s/p ce/iol/abic, left   doing well.   Continue pred/dicl/moxi QID  Discontinue glaucoma drops  shield and activity restrictions.  RTC 1 week, sooner prn       1.. chronic angle closure glaucoma, mild stage, bilateral  - s/p LPI OU  -nerves with some cupping but no definitive VF defect OU  IOP higher today while on latan qhs oU  given below recommend combining with canaloplasty to reduce drop burden/improved IOP control    2. Cataract, nuclear sclerotic, both eyes  72 yo patient presents with visually significant cataract of left>right eye affecting activities of daily living including using her firearm. It is believed removal of the cataracts will improve vision adn thus qualuty of life. After discussing r/b/a to surgery the patient elected to proceed left eye first hten right. Lens options were discussed including pros/cons/and eligibility for monofocal, toric, multifocal, and toric multifocal lenses and patient elected to proceed with monofocal. patient`s current mrx is myopic. target after surgery will be plano. The patient was told to continue all medications through surgery.  anesthesia will planned to be mac with topical. will combine with abic

## 2023-11-28 NOTE — INTERVAL H&P NOTE
H&P reviewed. The patient was examined and there are no changes to the H&P.       Review of Systems   Constitutional: Negative.    All other systems reviewed and are negative.       Physical Exam  Vitals reviewed.   Constitutional:       Appearance: Normal appearance.   HENT:      Head: Normocephalic and atraumatic.      Mouth/Throat:      Pharynx: Oropharynx is clear.   Eyes:      Conjunctiva/sclera: Conjunctivae normal.   Abdominal:      General: Abdomen is flat.   Musculoskeletal:         General: Normal range of motion.      Cervical back: Normal range of motion.   Skin:     General: Skin is warm.   Neurological:      General: No focal deficit present.      Mental Status: She is alert and oriented to person, place, and time.   Psychiatric:         Mood and Affect: Mood normal.         Behavior: Behavior normal.

## 2023-12-07 ENCOUNTER — OFFICE VISIT (OUTPATIENT)
Dept: OPHTHALMOLOGY | Facility: CLINIC | Age: 73
End: 2023-12-07
Payer: MEDICARE

## 2023-12-07 DIAGNOSIS — Z96.1 PSEUDOPHAKIA: Primary | ICD-10-CM

## 2023-12-07 PROCEDURE — 99024 POSTOP FOLLOW-UP VISIT: CPT | Performed by: OPHTHALMOLOGY

## 2023-12-07 ASSESSMENT — VISUAL ACUITY
METHOD: SNELLEN - LINEAR
OS_SC: 20/30
OS_SC+: -1

## 2023-12-07 ASSESSMENT — PACHYMETRY
OD_CT(UM): 588
OS_CT(UM): 576

## 2023-12-07 ASSESSMENT — EXTERNAL EXAM - RIGHT EYE: OD_EXAM: NORMAL

## 2023-12-07 ASSESSMENT — TONOMETRY
OS_IOP_MMHG: 13
IOP_METHOD: GOLDMANN APPLANATION

## 2023-12-07 ASSESSMENT — SLIT LAMP EXAM - LIDS
COMMENTS: NORMAL
COMMENTS: NORMAL

## 2023-12-07 ASSESSMENT — EXTERNAL EXAM - LEFT EYE: OS_EXAM: NORMAL

## 2023-12-07 NOTE — PROGRESS NOTES
POw1 s/p ce/iol/abic, left   doing well.   Taper  pred/  Stop moxi QID  Discontinue glaucoma drops, continue od  discontinue shield and activity restrictions.  Ok to proceed with OD      1.. chronic angle closure glaucoma, mild stage, bilateral  - s/p LPI OU  -nerves with some cupping but no definitive VF defect OU  IOP higher today while on latan qhs oU  given below recommend combining with canaloplasty to reduce drop burden/improved IOP control    2. Cataract, nuclear sclerotic, both eyes  74 yo patient presents with visually significant cataract of left>right eye affecting activities of daily living including using her firearm. It is believed removal of the cataracts will improve vision adn thus qualuty of life. After discussing r/b/a to surgery the patient elected to proceed left eye first hten right. Lens options were discussed including pros/cons/and eligibility for monofocal, toric, multifocal, and toric multifocal lenses and patient elected to proceed with monofocal. patient`s current mrx is myopic. target after surgery will be plano. The patient was told to continue all medications through surgery.  anesthesia will planned to be mac with topical. will combine with abic

## 2023-12-12 ENCOUNTER — OFFICE VISIT (OUTPATIENT)
Dept: OPHTHALMOLOGY | Facility: CLINIC | Age: 73
End: 2023-12-12
Payer: MEDICARE

## 2023-12-12 DIAGNOSIS — Z96.1 PSEUDOPHAKIA: Primary | ICD-10-CM

## 2023-12-12 DIAGNOSIS — H20.00 ACUTE IRITIS: ICD-10-CM

## 2023-12-12 PROCEDURE — 99024 POSTOP FOLLOW-UP VISIT: CPT | Performed by: OPHTHALMOLOGY

## 2023-12-12 RX ORDER — PREDNISOLONE ACETATE 10 MG/ML
1 SUSPENSION/ DROPS OPHTHALMIC 4 TIMES DAILY
Qty: 5 ML | Refills: 0 | Status: SHIPPED | OUTPATIENT
Start: 2023-12-12 | End: 2023-12-26

## 2023-12-12 ASSESSMENT — CONF VISUAL FIELD
OS_NORMAL: 1
OD_SUPERIOR_TEMPORAL_RESTRICTION: 0
OD_NORMAL: 1
OD_INFERIOR_TEMPORAL_RESTRICTION: 0
OS_INFERIOR_NASAL_RESTRICTION: 0
OS_SUPERIOR_TEMPORAL_RESTRICTION: 0
OS_INFERIOR_TEMPORAL_RESTRICTION: 0
OS_SUPERIOR_NASAL_RESTRICTION: 0
OD_INFERIOR_NASAL_RESTRICTION: 0
OD_SUPERIOR_NASAL_RESTRICTION: 0

## 2023-12-12 ASSESSMENT — PACHYMETRY
OD_CT(UM): 588
OS_CT(UM): 576

## 2023-12-12 ASSESSMENT — TONOMETRY
OD_IOP_MMHG: 20
IOP_METHOD: TONOPEN
OS_IOP_MMHG: 17

## 2023-12-12 ASSESSMENT — EXTERNAL EXAM - RIGHT EYE: OD_EXAM: NORMAL

## 2023-12-12 ASSESSMENT — EXTERNAL EXAM - LEFT EYE: OS_EXAM: NORMAL

## 2023-12-12 ASSESSMENT — CUP TO DISC RATIO: OS_RATIO: 0.75

## 2023-12-12 ASSESSMENT — SLIT LAMP EXAM - LIDS
COMMENTS: NORMAL
COMMENTS: NORMAL

## 2023-12-12 ASSESSMENT — VISUAL ACUITY
CORRECTION_TYPE: GLASSES
METHOD: SNELLEN - LINEAR
OD_CC: 20/25

## 2023-12-13 NOTE — PROGRESS NOTES
Patient seen in triage clinic for concern of left eye redness and irritation.    HPI: 72 y/o with history of mild chronic angle closure glaucoma left eye status post (s/p) laser peripheral iridotomy (LPI) both eyes and ce/iol/abic left eye 11/27/23 with unremarkable POW1 complaining of left eye irritation and redness for 2 days, denies eye pain. Endorses temporal floater for last 2 days. Also complaining of increasing dry eye sensation in the left eye. Denies any visual complaints right eye. Denies any flashes, eye pain, or vision loss in the left eye. Endorses some symptomatic improvement after starting artificial tears.     Orx:  Latanoprost at bedtime right eye  Left eye: Using prednisolone TID, Moxiloxacin TID, and Artificial tears QID     SLE:  right eye- grossly wnl   left eye   1+ injection, tr-1+ temporal corneal edema, 2 o clock corneal suture, tr superficial punctate keratitis (SPK), laser peripheral iridotomy (LPI) patent, 2+ cell, no hypopyon     DFE left eye   0.75 cup-to-disc ratio (C/D) m/v/p wnl     Discussed and seen with Dr. Callahan  Discussed with Dr. Sheehan    #JORGE left eye  #Rebound iritis left eye   - Stop Moxifloxacin drops left eye   - Increase prednisolone drops to QID left eye   - Continue artifical tears QID left eye   - Follow-up with Dr. Sheehan

## 2024-01-11 ENCOUNTER — OFFICE VISIT (OUTPATIENT)
Dept: OPHTHALMOLOGY | Facility: CLINIC | Age: 74
End: 2024-01-11
Payer: MEDICARE

## 2024-01-11 DIAGNOSIS — Z96.1 PSEUDOPHAKIA: ICD-10-CM

## 2024-01-11 DIAGNOSIS — H40.033 ANATOMICAL NARROW ANGLE GLAUCOMA, BILATERAL: Primary | ICD-10-CM

## 2024-01-11 PROCEDURE — 99024 POSTOP FOLLOW-UP VISIT: CPT | Performed by: OPHTHALMOLOGY

## 2024-01-11 RX ORDER — LATANOPROST/PF 0.005 %
1 DROPS OPHTHALMIC (EYE) NIGHTLY
Qty: 7.5 ML | Refills: 3 | Status: SHIPPED | OUTPATIENT
Start: 2024-01-11 | End: 2025-01-10

## 2024-01-11 ASSESSMENT — VISUAL ACUITY
OD_SC: 20/400
OD_PH_SC: 20/60
OS_SC: 20/25
METHOD: SNELLEN - LINEAR
OS_SC+: -1
OD_PH_SC+: -1

## 2024-01-11 ASSESSMENT — CONF VISUAL FIELD
OS_INFERIOR_NASAL_RESTRICTION: 0
OD_SUPERIOR_TEMPORAL_RESTRICTION: 0
METHOD: COUNTING FINGERS
OS_INFERIOR_TEMPORAL_RESTRICTION: 0
OS_SUPERIOR_NASAL_RESTRICTION: 0
OD_INFERIOR_NASAL_RESTRICTION: 0
OS_SUPERIOR_TEMPORAL_RESTRICTION: 0
OS_NORMAL: 1
OD_INFERIOR_TEMPORAL_RESTRICTION: 0
OD_NORMAL: 1
OD_SUPERIOR_NASAL_RESTRICTION: 0

## 2024-01-11 ASSESSMENT — EXTERNAL EXAM - LEFT EYE: OS_EXAM: NORMAL

## 2024-01-11 ASSESSMENT — PACHYMETRY
OS_CT(UM): 576
OD_CT(UM): 588

## 2024-01-11 ASSESSMENT — SLIT LAMP EXAM - LIDS
COMMENTS: NORMAL
COMMENTS: NORMAL

## 2024-01-11 ASSESSMENT — TONOMETRY
IOP_METHOD: GOLDMANN APPLANATION
OD_IOP_MMHG: 21
OS_IOP_MMHG: 20

## 2024-01-11 ASSESSMENT — EXTERNAL EXAM - RIGHT EYE: OD_EXAM: NORMAL

## 2024-01-11 NOTE — PROGRESS NOTES
POm1 s/p ce/iol/abic, left   doing well.   Off pred   Stop moxi QID  Discontinue glaucoma drops OS, continue od  discontinue shield and activity restrictions.  Ok to proceed with OD, scheduled in 2 weeks       1. chronic angle closure glaucoma, mild stage, bilateral  - s/p LPI OU  -nerves with some cupping but no definitive VF defect OU  IOP higher today while on latan qhs oU  given below recommend combining with canaloplasty to reduce drop burden/improved IOP control    2. Cataract, nuclear sclerotic, both eyes  72 yo patient presents with visually significant cataract of left>right eye affecting activities of daily living including using her firearm. It is believed removal of the cataracts will improve vision adn thus qualuty of life. After discussing r/b/a to surgery the patient elected to proceed left eye first hten right. Lens options were discussed including pros/cons/and eligibility for monofocal, toric, multifocal, and toric multifocal lenses and patient elected to proceed with monofocal. patient`s current mrx is myopic. target after surgery will be plano. The patient was told to continue all medications through surgery.  anesthesia will planned to be mac with topical. will combine with abic

## 2024-01-17 DIAGNOSIS — H25.13 CATARACT, NUCLEAR SCLEROTIC, BOTH EYES: Primary | ICD-10-CM

## 2024-01-17 PROCEDURE — RXMED WILLOW AMBULATORY MEDICATION CHARGE

## 2024-01-17 RX ORDER — MOXIFLOXACIN 5 MG/ML
1 SOLUTION/ DROPS OPHTHALMIC 4 TIMES DAILY
Qty: 3 ML | Refills: 0 | Status: SHIPPED | OUTPATIENT
Start: 2024-01-17

## 2024-01-17 RX ORDER — PREDNISOLONE SODIUM PHOSPHATE 10 MG/ML
1 SOLUTION/ DROPS OPHTHALMIC 4 TIMES DAILY
COMMUNITY
End: 2024-01-17 | Stop reason: ENTERED-IN-ERROR

## 2024-01-17 RX ORDER — PREDNISOLONE ACETATE 10 MG/ML
1 SUSPENSION/ DROPS OPHTHALMIC 4 TIMES DAILY
Qty: 5 ML | Refills: 0 | Status: SHIPPED | OUTPATIENT
Start: 2024-01-17

## 2024-01-17 RX ORDER — PREDNISOLONE ACETATE 10 MG/ML
1 SUSPENSION/ DROPS OPHTHALMIC 4 TIMES DAILY
COMMUNITY
End: 2024-01-17 | Stop reason: SDUPTHER

## 2024-01-17 RX ORDER — MOXIFLOXACIN 5 MG/ML
1 SOLUTION/ DROPS OPHTHALMIC 3 TIMES DAILY
COMMUNITY
End: 2024-01-17 | Stop reason: SDUPTHER

## 2024-01-19 ENCOUNTER — ANESTHESIA EVENT (OUTPATIENT)
Dept: OPERATING ROOM | Facility: CLINIC | Age: 74
End: 2024-01-19
Payer: MEDICARE

## 2024-01-19 ENCOUNTER — PHARMACY VISIT (OUTPATIENT)
Dept: PHARMACY | Facility: CLINIC | Age: 74
End: 2024-01-19
Payer: COMMERCIAL

## 2024-01-19 RX ORDER — LIDOCAINE IN NACL,ISO-OSMOT/PF 30 MG/3 ML
0.1 SYRINGE (ML) INJECTION ONCE
Status: CANCELLED | OUTPATIENT
Start: 2024-01-19 | End: 2024-01-19

## 2024-01-19 RX ORDER — SODIUM CHLORIDE, SODIUM LACTATE, POTASSIUM CHLORIDE, CALCIUM CHLORIDE 600; 310; 30; 20 MG/100ML; MG/100ML; MG/100ML; MG/100ML
100 INJECTION, SOLUTION INTRAVENOUS CONTINUOUS
Status: CANCELLED | OUTPATIENT
Start: 2024-01-19

## 2024-01-19 RX ORDER — ONDANSETRON HYDROCHLORIDE 2 MG/ML
4 INJECTION, SOLUTION INTRAVENOUS ONCE AS NEEDED
Status: CANCELLED | OUTPATIENT
Start: 2024-01-19

## 2024-01-22 ENCOUNTER — HOSPITAL ENCOUNTER (OUTPATIENT)
Facility: CLINIC | Age: 74
Setting detail: OUTPATIENT SURGERY
Discharge: HOME | End: 2024-01-22
Attending: OPHTHALMOLOGY | Admitting: OPHTHALMOLOGY
Payer: MEDICARE

## 2024-01-22 ENCOUNTER — ANESTHESIA (OUTPATIENT)
Dept: OPERATING ROOM | Facility: CLINIC | Age: 74
End: 2024-01-22
Payer: MEDICARE

## 2024-01-22 VITALS
RESPIRATION RATE: 18 BRPM | HEART RATE: 77 BPM | OXYGEN SATURATION: 99 % | BODY MASS INDEX: 29.13 KG/M2 | SYSTOLIC BLOOD PRESSURE: 171 MMHG | DIASTOLIC BLOOD PRESSURE: 87 MMHG | HEIGHT: 64 IN | TEMPERATURE: 97.2 F | WEIGHT: 170.64 LBS

## 2024-01-22 DIAGNOSIS — H25.13 AGE-RELATED NUCLEAR CATARACT OF BOTH EYES: Primary | ICD-10-CM

## 2024-01-22 DIAGNOSIS — H40.2231 CHRONIC ANGLE-CLOSURE GLAUCOMA OF BOTH EYES, MILD STAGE: ICD-10-CM

## 2024-01-22 PROCEDURE — 99100 ANES PT EXTEME AGE<1 YR&>70: CPT | Performed by: ANESTHESIOLOGY

## 2024-01-22 PROCEDURE — 3700000001 HC GENERAL ANESTHESIA TIME - INITIAL BASE CHARGE: Performed by: OPHTHALMOLOGY

## 2024-01-22 PROCEDURE — 66984 XCAPSL CTRC RMVL W/O ECP: CPT | Performed by: OPHTHALMOLOGY

## 2024-01-22 PROCEDURE — 2500000001 HC RX 250 WO HCPCS SELF ADMINISTERED DRUGS (ALT 637 FOR MEDICARE OP): Performed by: OPHTHALMOLOGY

## 2024-01-22 PROCEDURE — 7100000010 HC PHASE TWO TIME - EACH INCREMENTAL 1 MINUTE: Performed by: OPHTHALMOLOGY

## 2024-01-22 PROCEDURE — A66984 PR REMV CATARACT EXTRACAP,INSERT LENS: Performed by: ANESTHESIOLOGY

## 2024-01-22 PROCEDURE — 3600000003 HC OR TIME - INITIAL BASE CHARGE - PROCEDURE LEVEL THREE: Performed by: OPHTHALMOLOGY

## 2024-01-22 PROCEDURE — 7100000009 HC PHASE TWO TIME - INITIAL BASE CHARGE: Performed by: OPHTHALMOLOGY

## 2024-01-22 PROCEDURE — 2720000007 HC OR 272 NO HCPCS: Performed by: OPHTHALMOLOGY

## 2024-01-22 PROCEDURE — 2500000004 HC RX 250 GENERAL PHARMACY W/ HCPCS (ALT 636 FOR OP/ED)

## 2024-01-22 PROCEDURE — 2760000001 HC OR 276 NO HCPCS: Performed by: OPHTHALMOLOGY

## 2024-01-22 PROCEDURE — A66984 PR REMV CATARACT EXTRACAP,INSERT LENS

## 2024-01-22 PROCEDURE — 65820 GONIOTOMY: CPT | Performed by: OPHTHALMOLOGY

## 2024-01-22 PROCEDURE — 3700000002 HC GENERAL ANESTHESIA TIME - EACH INCREMENTAL 1 MINUTE: Performed by: OPHTHALMOLOGY

## 2024-01-22 PROCEDURE — 3600000008 HC OR TIME - EACH INCREMENTAL 1 MINUTE - PROCEDURE LEVEL THREE: Performed by: OPHTHALMOLOGY

## 2024-01-22 PROCEDURE — 2500000004 HC RX 250 GENERAL PHARMACY W/ HCPCS (ALT 636 FOR OP/ED): Performed by: OPHTHALMOLOGY

## 2024-01-22 DEVICE — ACRYSOF(R) IQ ASPHERIC NATURAL IOL, SINGLE-PIECE ACRYLIC FOLDABLE PCL, UV WITH BLUE LIGHTFILTER, 13.0MM LENGTH, 6.0MM ANTERIORASYMMETRIC BICONVEX OPTIC, PLANAR HAPTICS.
Type: IMPLANTABLE DEVICE | Site: EYE | Status: FUNCTIONAL
Brand: ACRYSOF®

## 2024-01-22 RX ORDER — CYCLOPENTOLATE HYDROCHLORIDE 10 MG/ML
1 SOLUTION/ DROPS OPHTHALMIC
Status: COMPLETED | OUTPATIENT
Start: 2024-01-22 | End: 2024-01-22

## 2024-01-22 RX ORDER — PREDNISOLONE ACETATE 10 MG/ML
SUSPENSION/ DROPS OPHTHALMIC AS NEEDED
Status: DISCONTINUED | OUTPATIENT
Start: 2024-01-22 | End: 2024-01-22 | Stop reason: HOSPADM

## 2024-01-22 RX ORDER — MOXIFLOXACIN 5 MG/ML
1 SOLUTION/ DROPS OPHTHALMIC
Status: COMPLETED | OUTPATIENT
Start: 2024-01-22 | End: 2024-01-22

## 2024-01-22 RX ORDER — ACETAMINOPHEN 325 MG/1
TABLET ORAL AS NEEDED
Status: DISCONTINUED | OUTPATIENT
Start: 2024-01-22 | End: 2024-01-22

## 2024-01-22 RX ORDER — PHENYLEPHRINE HYDROCHLORIDE 25 MG/ML
1 SOLUTION/ DROPS OPHTHALMIC
Status: COMPLETED | OUTPATIENT
Start: 2024-01-22 | End: 2024-01-22

## 2024-01-22 RX ORDER — DICLOFENAC SODIUM 1 MG/ML
SOLUTION/ DROPS OPHTHALMIC AS NEEDED
Status: DISCONTINUED | OUTPATIENT
Start: 2024-01-22 | End: 2024-01-22 | Stop reason: HOSPADM

## 2024-01-22 RX ORDER — SODIUM CHLORIDE, SODIUM LACTATE, POTASSIUM CHLORIDE, CALCIUM CHLORIDE 600; 310; 30; 20 MG/100ML; MG/100ML; MG/100ML; MG/100ML
INJECTION, SOLUTION INTRAVENOUS CONTINUOUS PRN
Status: DISCONTINUED | OUTPATIENT
Start: 2024-01-22 | End: 2024-01-22

## 2024-01-22 RX ORDER — MOXIFLOXACIN 5 MG/ML
SOLUTION/ DROPS OPHTHALMIC AS NEEDED
Status: DISCONTINUED | OUTPATIENT
Start: 2024-01-22 | End: 2024-01-22 | Stop reason: HOSPADM

## 2024-01-22 RX ORDER — TROPICAMIDE 10 MG/ML
1 SOLUTION/ DROPS OPHTHALMIC
Status: COMPLETED | OUTPATIENT
Start: 2024-01-22 | End: 2024-01-22

## 2024-01-22 RX ORDER — MIDAZOLAM HYDROCHLORIDE 1 MG/ML
INJECTION, SOLUTION INTRAMUSCULAR; INTRAVENOUS AS NEEDED
Status: DISCONTINUED | OUTPATIENT
Start: 2024-01-22 | End: 2024-01-22

## 2024-01-22 RX ORDER — EPINEPHRINE 1 MG/ML
INJECTION, SOLUTION, CONCENTRATE INTRAVENOUS AS NEEDED
Status: DISCONTINUED | OUTPATIENT
Start: 2024-01-22 | End: 2024-01-22 | Stop reason: HOSPADM

## 2024-01-22 RX ADMIN — PHENYLEPHRINE HYDROCHLORIDE 1 DROP: 25 SOLUTION/ DROPS OPHTHALMIC at 12:34

## 2024-01-22 RX ADMIN — MOXIFLOXACIN 1 DROP: 5 SOLUTION/ DROPS OPHTHALMIC at 12:34

## 2024-01-22 RX ADMIN — MOXIFLOXACIN 1 DROP: 5 SOLUTION/ DROPS OPHTHALMIC at 12:24

## 2024-01-22 RX ADMIN — PHENYLEPHRINE HYDROCHLORIDE 1 DROP: 25 SOLUTION/ DROPS OPHTHALMIC at 12:24

## 2024-01-22 RX ADMIN — MIDAZOLAM 2 MG: 1 INJECTION INTRAMUSCULAR; INTRAVENOUS at 13:02

## 2024-01-22 RX ADMIN — ACETAMINOPHEN 650 MG: 325 TABLET ORAL at 12:59

## 2024-01-22 RX ADMIN — CYCLOPENTOLATE HYDROCHLORIDE 1 DROP: 10 SOLUTION/ DROPS OPHTHALMIC at 12:24

## 2024-01-22 RX ADMIN — TROPICAMIDE 1 DROP: 10 SOLUTION/ DROPS OPHTHALMIC at 12:29

## 2024-01-22 RX ADMIN — PHENYLEPHRINE HYDROCHLORIDE 1 DROP: 25 SOLUTION/ DROPS OPHTHALMIC at 12:29

## 2024-01-22 RX ADMIN — TROPICAMIDE 1 DROP: 10 SOLUTION/ DROPS OPHTHALMIC at 12:24

## 2024-01-22 RX ADMIN — TROPICAMIDE 1 DROP: 10 SOLUTION/ DROPS OPHTHALMIC at 12:34

## 2024-01-22 RX ADMIN — CYCLOPENTOLATE HYDROCHLORIDE 1 DROP: 10 SOLUTION/ DROPS OPHTHALMIC at 12:29

## 2024-01-22 RX ADMIN — CYCLOPENTOLATE HYDROCHLORIDE 1 DROP: 10 SOLUTION/ DROPS OPHTHALMIC at 12:34

## 2024-01-22 RX ADMIN — SODIUM CHLORIDE, POTASSIUM CHLORIDE, SODIUM LACTATE AND CALCIUM CHLORIDE: 600; 310; 30; 20 INJECTION, SOLUTION INTRAVENOUS at 13:02

## 2024-01-22 RX ADMIN — MOXIFLOXACIN 1 DROP: 5 SOLUTION/ DROPS OPHTHALMIC at 12:29

## 2024-01-22 ASSESSMENT — PAIN SCALES - GENERAL
PAINLEVEL_OUTOF10: 0 - NO PAIN

## 2024-01-22 ASSESSMENT — PAIN - FUNCTIONAL ASSESSMENT
PAIN_FUNCTIONAL_ASSESSMENT: 0-10

## 2024-01-22 ASSESSMENT — COLUMBIA-SUICIDE SEVERITY RATING SCALE - C-SSRS
2. HAVE YOU ACTUALLY HAD ANY THOUGHTS OF KILLING YOURSELF?: NO
1. IN THE PAST MONTH, HAVE YOU WISHED YOU WERE DEAD OR WISHED YOU COULD GO TO SLEEP AND NOT WAKE UP?: NO
6. HAVE YOU EVER DONE ANYTHING, STARTED TO DO ANYTHING, OR PREPARED TO DO ANYTHING TO END YOUR LIFE?: NO
6. HAVE YOU EVER DONE ANYTHING, STARTED TO DO ANYTHING, OR PREPARED TO DO ANYTHING TO END YOUR LIFE?: NO
2. HAVE YOU ACTUALLY HAD ANY THOUGHTS OF KILLING YOURSELF?: NO
1. IN THE PAST MONTH, HAVE YOU WISHED YOU WERE DEAD OR WISHED YOU COULD GO TO SLEEP AND NOT WAKE UP?: NO

## 2024-01-22 NOTE — H&P
History Of Present Illness  Clarissa Boo is a 73 y.o. female presenting with history of visually-significant cataract and glaucoma in the right eye here for cataract extraction and intraocular lens insertion and goniotomy of the right eye .     Past Medical History  Past Medical History:   Diagnosis Date    Arthritis     Asthma     Cataract     Glaucoma     Hyperlipidemia     Hypertension        Surgical History  Past Surgical History:   Procedure Laterality Date    OTHER SURGICAL HISTORY  03/01/2019    Hysterectomy    OTHER SURGICAL HISTORY  03/01/2019    Foot surgery        Social History  She reports that she has never smoked. She has never used smokeless tobacco. No history on file for alcohol use and drug use.    Family History  Family History   Problem Relation Name Age of Onset    Glaucoma Mother      Kidney disease Mother      Heart failure Mother      Glaucoma Sister      Hypertension Sister      Diabetes Sister          Allergies  Aspirin    Review of Systems  Constitutional: Negative.    HENT: Negative.     Eyes: Negative.    Respiratory: Negative.     Cardiovascular: Negative.    Gastrointestinal: Negative.    Endocrine: Negative.  .    Neurological: Negative.    Psychiatric/Behavioral: Negative.      Physical Exam  General: Alert and Oriented x3  Head and neck: atraumatic and normacephalic  Lungs: The chest wall is symmetric and without deformity. No signs of respiratory distress. Lung sounds are clear in all lobes bilaterally.   Heart: Regular rate and rhythm.   Abdomen: Soft and non-tender      Last Recorded Vitals  There were no vitals taken for this visit.    Relevant Results           Assessment/Plan   Active Problems:    Age-related nuclear cataract of both eyes    Chronic angle-closure glaucoma of both eyes, mild stage    -history of visually-significant cataract and glaucoma in the right eye here for cataract extraction and intraocular lens insertion and goniotomy of the right eye .          Janine Perez MD

## 2024-01-22 NOTE — OP NOTE
Cataract Phacoemulsification w/I0L (R) Operative Note     Date: 2024  OR Location: [facility] [place] [facility]    Name: Clarissa Boo, : 1950, Age: 73 y.o., MRN: [id number], Sex: female    Diagnosis  Pre-op Diagnosis     * Age-related nuclear cataract of both eyes [H25.13]     * Chronic angle-closure glaucoma of both eyes, mild stage [H40.2231] Post-op Diagnosis     * Age-related nuclear cataract of both eyes [H25.13]     * Chronic angle-closure glaucoma of both eyes, mild stage [H40.2231]     Procedures  Extraction Cataract with Insertion Intraocular Lens  37425 - [place] XCAPSL CTRC RMVL INSJ IO LENS PROSTH W/O ECP    Goniotomy  22352 - [place] GONIOTOMY  , RIGHT    Surgeons   Panel 1:     * Ani Sheehan - Primary  Panel 2:     * Ani Sheehan - Primary    Resident/Fellow/Other Assistant:  Chris    Procedure Summary  Anesthesia: Monitor Anesthesia Care  ASA: II  Anesthesia Staff:   Anesthesiologist: Onur Orourke MD  C-AA: KENNY Overton  Estimated Blood Loss: none        Specimen: No specimens collected     Staff:   Circulator: Mansi Hill RN; Dawson Betancourt RN  Scrub Person: Megha Vanegas          Findings: as expected    Indications: Clarissa Boo is an 73 y.o. female who is having surgery for Age-related nuclear cataract of both eyes [H25.13]  Chronic angle-closure glaucoma of both eyes, mild stage [H40.2231]. RIGHT    The patient was seen in the preoperative area. The risks, benefits, complications, treatment options, non-operative alternatives, expected recovery and outcomes were discussed with the patient. The possibilities of reaction to medication, pulmonary aspiration, injury to surrounding structures, bleeding, recurrent infection, the need for additional procedures, failure to diagnose a condition, and creating a complication or operation were discussed with the patient. The patient concurred with the proposed plan, giving informed consent.  The site of surgery was  properly noted/marked if necessary per policy.     Procedure Details:   The patient was escorted to the operating room where a time out was completed   and monitored anesthesia care was begun. The patient was prepped and draped in   the usual sterile fashion for intraocular surgery. A lid speculum was placed   and the operating microscope was positioned. A paracentesis was made to the   left of the planned cataract incision with a 1mm blade.  Shugarcaine followed by Viscoat was used to   replace the aqueous humor. A temporal clear corneal wound was made with a 2.4   mm keratome, extending 2 mm into clear cornea before entering the anterior   chamber. A continuous curvilinear  capsulorhexis of approximately 5 mm in   diameter was performed. Hydrodissection was performed using a canula. The   pre-chopper was used to crack the nucleus into smaller pieces which were then   removed with the assistance of a horizontal chopper and phaco hand piece.   Residual cortex was removed with IA. ProVisc was used to inflate the capsular   bag. The lens implant  alex [id number] 13.5 diopter intraocular lens. The lens was   inserted into the capsular bag and rotated to the proper position with a   juno. The patient was then positioned for direct gonioscopy. A hour 3 clock hour nasal goniotomy was made.   The microcatheter was primed and then thread for 360 degrees and then retracted whilst   injecting viscoelastic approximately 8-10 clock hours per quadrant. the device   was then removed an dthe patietn was repositioned supine. Residual   viscoelastic was removed from the eye with the irrigation/aspiration   instrument. The wounds were checked and found to be watertight. The anterior   chamber was at physiologic depth and pressure. The lid speculum was removed and   a patch and shield were taped in place. The patient tolerated the procedure   well, and there were no complications.   Complications:  None   Disposition:  stable          Attending Attestation: I was present and scrubbed for the entire procedure    Ani Sheehan  Phone Number: [phone]

## 2024-01-22 NOTE — ANESTHESIA POSTPROCEDURE EVALUATION
Patient: Clarissa Boo    Procedure Summary       Date: 01/22/24 Room / Location: [facility] OR 03 / [facility] [place] [facility]    Anesthesia Start: 1302 Anesthesia Stop: 1344    Procedures:       Extraction Cataract with Insertion Intraocular Lens (Right: Eye)      Goniotomy (Right: Eye) Diagnosis:       Age-related nuclear cataract of both eyes      Chronic angle-closure glaucoma of both eyes, mild stage      (Age-related nuclear cataract of both eyes [H25.13])      (Chronic angle-closure glaucoma of both eyes, mild stage [H40.2231])    Surgeons: Ani Sheehan MD Responsible Provider: Onur Orourke MD    Anesthesia Type: MAC ASA Status: 2            Anesthesia Type: MAC    Vitals Value Taken Time   /87 01/22/24 1409   Temp 36.2 °C (97.2 °F) 01/22/24 1409   Pulse 77 01/22/24 1409   Resp 18 01/22/24 1409   SpO2 99 % 01/22/24 1409       Anesthesia Post Evaluation    Patient location during evaluation: bedside  Patient participation: complete - patient participated  Level of consciousness: awake and alert  Pain management: adequate  Airway patency: patent  Cardiovascular status: acceptable  Respiratory status: acceptable  Hydration status: balanced  Postoperative Nausea and Vomiting: none  Comments: Did well    No notable events documented.

## 2024-01-22 NOTE — ANESTHESIA PREPROCEDURE EVALUATION
Patient: Clarissa Boo    Procedure Information       Date/Time: 01/22/24 1300    Procedures:       Extraction Cataract with Insertion Intraocular Lens (Right: Eye)      Goniotomy (Right: Eye)    Location: [facility] OR 03 / [facility] OR    Surgeons: Ani Sheehan MD            Relevant Problems   Cardiovascular   (+) Benign essential hypertension   (+) Hyperlipidemia      Neuro/Psych   (+) Cervical radiculitis      Pulmonary   (+) Asthma, moderate persistent      Hematology   (+) Anemia, iron deficiency      Musculoskeletal   (+) Cervical spondylosis without myelopathy      Eyes, Ears, Nose, and Throat   (+) Anatomical narrow angle glaucoma, bilateral   (+) Chronic angle-closure glaucoma of both eyes, mild stage   (+) Primary open-angle glaucoma, bilateral, mild stage       Clinical information reviewed: stable- no interval acute changes since last procedure   Tobacco  Allergies  Meds  Problems  Med Hx  Surg Hx   Fam Hx  Soc   Hx        NPO Detail:  NPO/Void Status  Date of Last Liquid: 01/21/24  Time of Last Liquid: 1900  Date of Last Solid: 01/21/24  Time of Last Solid: 1900  Last Intake Type: Clear fluids  Time of Last Void: 1208         Physical Exam    Airway  Mallampati: III     Cardiovascular   Rhythm: regular  Rate: normal     Dental   (+) upper dentures     Pulmonary - normal exam  Breath sounds clear to auscultation     Abdominal      Other findings: Parital dentures up        Anesthesia Plan    History of general anesthesia?: yes  History of complications of general anesthesia?: no    ASA 2     MAC     intravenous induction   Anesthetic plan and risks discussed with patient.    Plan discussed with [facility]AA.

## 2024-01-22 NOTE — DISCHARGE INSTRUCTIONS
Surgeon: Ani Sheehan MD    Patient name: Clarissa Boo    Date of visit:January 22, 2024      {RIGHT/LEFT:33270} cataract surgery post op instructions    Drops after surgery:  Moxifloxacin (tan)  Prednisolone (white/pink)    Start these drops at 6 pm tonight and use them again before bed  Starting day after surgery use drop 4x a day (breakfast, lunch, dinner, bedtime)  Wait 5 minutes in between drops    Bring all drops with you to your appointment tomorrow    No heavy lifting over 10-15 lbs, no bending over, do not get eye wet, no eye rubbing. Wear eye shield at anytime you are sleeping. Shower from neck down only. Please call immediately if you develop worsening redness, pain, decreased vision, flashes, floaters.     TYLENOL GIVEN AT 1PM NEXT DOSE 7PM IF NEEDED    During regular business hours call: [phone]), choose option for , and Dr. Sheehan [place]  After hours call hospital : [phone], ask to speak with on-call ophthalmology resident

## 2024-01-23 ENCOUNTER — OFFICE VISIT (OUTPATIENT)
Dept: OPHTHALMOLOGY | Facility: CLINIC | Age: 74
End: 2024-01-23
Payer: MEDICARE

## 2024-01-23 ENCOUNTER — TELEPHONE (OUTPATIENT)
Dept: OPHTHALMOLOGY | Facility: CLINIC | Age: 74
End: 2024-01-23

## 2024-01-23 DIAGNOSIS — Z96.1 PSEUDOPHAKIA: Primary | ICD-10-CM

## 2024-01-23 PROCEDURE — 99024 POSTOP FOLLOW-UP VISIT: CPT | Performed by: OPHTHALMOLOGY

## 2024-01-23 ASSESSMENT — EXTERNAL EXAM - LEFT EYE: OS_EXAM: NORMAL

## 2024-01-23 ASSESSMENT — REFRACTION_WEARINGRX
OD_AXIS: 096
OD_SPHERE: -4.50
OD_ADD: +2.75
OS_CYLINDER: -1.00
OS_SPHERE: -5.50
OS_AXIS: 100
OS_ADD: +2.75
OD_CYLINDER: -1.00

## 2024-01-23 ASSESSMENT — PACHYMETRY
OD_CT(UM): 588
OS_CT(UM): 576

## 2024-01-23 ASSESSMENT — TONOMETRY
OD_IOP_MMHG: 16
IOP_METHOD: GOLDMANN APPLANATION
OS_IOP_MMHG: 18

## 2024-01-23 ASSESSMENT — VISUAL ACUITY
OS_SC: 20/25
OD_SC: 20/30-1
METHOD: SNELLEN - LINEAR

## 2024-01-23 ASSESSMENT — EXTERNAL EXAM - RIGHT EYE: OD_EXAM: NORMAL

## 2024-01-23 ASSESSMENT — SLIT LAMP EXAM - LIDS
COMMENTS: NORMAL
COMMENTS: NORMAL

## 2024-01-23 ASSESSMENT — PAIN SCALES - GENERAL: PAINLEVEL_OUTOF10: 0 - NO PAIN

## 2024-01-23 NOTE — PROGRESS NOTES
POd1 s/p ce/iol/abic, right   doing well.   Pred/moxi QID   Discontinue glaucoma drops   shield and activity restrictions.  Rtc 1 week, sooner PRN      1. chronic angle closure glaucoma, mild stage, bilateral  - s/p LPI OU  -nerves with some cupping but no definitive VF defect OU  IOP higher today while on latan qhs oU  given below recommend combining with canaloplasty to reduce drop burden/improved IOP control    2. Cataract, nuclear sclerotic, both eyes  72 yo patient presents with visually significant cataract of left>right eye affecting activities of daily living including using her firearm. It is believed removal of the cataracts will improve vision adn thus qualuty of life. After discussing r/b/a to surgery the patient elected to proceed left eye first hten right. Lens options were discussed including pros/cons/and eligibility for monofocal, toric, multifocal, and toric multifocal lenses and patient elected to proceed with monofocal. patient`s current mrx is myopic. target after surgery will be plano. The patient was told to continue all medications through surgery.  anesthesia will planned to be mac with topical. will combine with abic

## 2024-01-23 NOTE — TELEPHONE ENCOUNTER
74yo female with POH of chronic angle closure glaucoma, mild, OU s/p CEIOL + goniotomy OD earlier today called in at 10pm wondering if she should start pred/moxi today or after her POD1 appt tomorrow. I informed her that she should remove the patch and shield, do 1 dose of pred/moxi in the right eye, replace the shield, and start using both drops four times a day starting tomorrow.

## 2024-01-25 ENCOUNTER — OFFICE VISIT (OUTPATIENT)
Dept: OPHTHALMOLOGY | Facility: CLINIC | Age: 74
End: 2024-01-25
Payer: MEDICARE

## 2024-01-25 DIAGNOSIS — Z96.1 PSEUDOPHAKIA: Primary | ICD-10-CM

## 2024-01-25 PROCEDURE — 99024 POSTOP FOLLOW-UP VISIT: CPT | Performed by: OPHTHALMOLOGY

## 2024-01-25 RX ORDER — CYCLOPENTOLATE HYDROCHLORIDE 10 MG/ML
1 SOLUTION/ DROPS OPHTHALMIC 2 TIMES DAILY
Qty: 5 ML | Refills: 0 | Status: SHIPPED | OUTPATIENT
Start: 2024-01-25

## 2024-01-25 ASSESSMENT — REFRACTION_WEARINGRX
OS_AXIS: 100
OD_SPHERE: -4.50
OD_ADD: +2.75
OD_CYLINDER: -1.00
OD_AXIS: 096
OS_CYLINDER: -1.00
OS_ADD: +2.75
OS_SPHERE: -5.50

## 2024-01-25 ASSESSMENT — TONOMETRY
OD_IOP_MMHG: 12
IOP_METHOD: GOLDMANN APPLANATION

## 2024-01-25 ASSESSMENT — ENCOUNTER SYMPTOMS
CONSTITUTIONAL NEGATIVE: 0
MUSCULOSKELETAL NEGATIVE: 0
EYES NEGATIVE: 0
ENDOCRINE NEGATIVE: 0
CARDIOVASCULAR NEGATIVE: 0
NEUROLOGICAL NEGATIVE: 0
RESPIRATORY NEGATIVE: 0
GASTROINTESTINAL NEGATIVE: 0
ALLERGIC/IMMUNOLOGIC NEGATIVE: 0
PSYCHIATRIC NEGATIVE: 0
HEMATOLOGIC/LYMPHATIC NEGATIVE: 0

## 2024-01-25 ASSESSMENT — SLIT LAMP EXAM - LIDS
COMMENTS: NORMAL
COMMENTS: NORMAL

## 2024-01-25 ASSESSMENT — PACHYMETRY
OD_CT(UM): 588
OS_CT(UM): 576

## 2024-01-25 ASSESSMENT — VISUAL ACUITY
OD_SC: 20/30
OD_SC+: -2
METHOD: SNELLEN - LINEAR

## 2024-01-25 ASSESSMENT — EXTERNAL EXAM - LEFT EYE: OS_EXAM: NORMAL

## 2024-01-25 ASSESSMENT — CUP TO DISC RATIO: OD_RATIO: 0.5

## 2024-01-25 ASSESSMENT — EXTERNAL EXAM - RIGHT EYE: OD_EXAM: NORMAL

## 2024-01-25 NOTE — PROGRESS NOTES
POd3 s/p ce/iol/abic, right   Presents with photosensitivity/tenderness worsening since last visit  Increased cell mostly RBC/PIGMENT with 1+wbc, without hypopyon or no posterior segment inflammation   Given tr hyphema and RBCs and significant improve sx after dilation, favor postprocedural inflammation from Abic/malyugin usage, less likely infectious etiology   Patient symptoms improved in clinic with administration of prednisolone and cycloplegia  Start cyclopentolate bid  Increase to pred to q2h while awake   C/w moxi QID   Discontinue glaucoma drops   shield and activity restrictions.  Rtc tomorrow at 8 am for recheck        1. chronic angle closure glaucoma, mild stage, bilateral  - s/p LPI OU  -nerves with some cupping but no definitive VF defect OU  IOP higher today while on latan qhs oU  given below recommend combining with canaloplasty to reduce drop burden/improved IOP control    2. Cataract, nuclear sclerotic, both eyes  72 yo patient presents with visually significant cataract of left>right eye affecting activities of daily living including using her firearm. It is believed removal of the cataracts will improve vision adn thus qualuty of life. After discussing r/b/a to surgery the patient elected to proceed left eye first hten right. Lens options were discussed including pros/cons/and eligibility for monofocal, toric, multifocal, and toric multifocal lenses and patient elected to proceed with monofocal. patient`s current mrx is myopic. target after surgery will be plano. The patient was told to continue all medications through surgery.  anesthesia will planned to be mac with topical. will combine with abic

## 2024-01-26 ENCOUNTER — OFFICE VISIT (OUTPATIENT)
Dept: OPHTHALMOLOGY | Facility: CLINIC | Age: 74
End: 2024-01-26
Payer: MEDICARE

## 2024-01-26 DIAGNOSIS — H40.033 ANATOMICAL NARROW ANGLE GLAUCOMA, BILATERAL: Primary | ICD-10-CM

## 2024-01-26 DIAGNOSIS — H20.00 ACUTE IRITIS: ICD-10-CM

## 2024-01-26 PROCEDURE — 99024 POSTOP FOLLOW-UP VISIT: CPT | Performed by: OPHTHALMOLOGY

## 2024-01-26 ASSESSMENT — EXTERNAL EXAM - LEFT EYE: OS_EXAM: NORMAL

## 2024-01-26 ASSESSMENT — EXTERNAL EXAM - RIGHT EYE: OD_EXAM: NORMAL

## 2024-01-26 ASSESSMENT — PACHYMETRY
OS_CT(UM): 576
OD_CT(UM): 588

## 2024-01-26 ASSESSMENT — CUP TO DISC RATIO: OD_RATIO: 0.5

## 2024-01-26 ASSESSMENT — VISUAL ACUITY
OS_SC: 20/20-2
OD_SC: 20/60-2
METHOD: SNELLEN - LINEAR
OD_PH_SC: 20/30+2

## 2024-01-26 ASSESSMENT — SLIT LAMP EXAM - LIDS
COMMENTS: NORMAL
COMMENTS: NORMAL

## 2024-01-26 ASSESSMENT — TONOMETRY
IOP_METHOD: GOLDMANN APPLANATION
OS_IOP_MMHG: 18
OD_IOP_MMHG: 14

## 2024-01-26 NOTE — PROGRESS NOTES
POd4 s/p ce/iol/abic, right   Patient with symptomatic improvement however on exam there is fibrin strands in the anterior chamber (AC) now  No hypopnyon in anterior chamber (AC) or on gonio,   patient is no longer tender at all  Still favor postprocedural inflammation from Abic/malyugin usage, less likely infectious etiology   Continue cyclopentolate bid (patient states pharmacy will have ready later today, another drop of atropine administered in clinic)  continue to pred to q2h while awake   C/w moxi QID   Discontinue glaucoma drops   shield and activity restrictions.  Rtc tomorrow for recheck with resident (Dr. Peterson who is aware)        1. chronic angle closure glaucoma, mild stage, bilateral  - s/p LPI OU  -nerves with some cupping but no definitive VF defect OU  IOP higher today while on latan qhs oU  given below recommend combining with canaloplasty to reduce drop burden/improved IOP control    2. Cataract, nuclear sclerotic, both eyes  72 yo patient presents with visually significant cataract of left>right eye affecting activities of daily living including using her firearm. It is believed removal of the cataracts will improve vision adn thus qualuty of life. After discussing r/b/a to surgery the patient elected to proceed left eye first hten right. Lens options were discussed including pros/cons/and eligibility for monofocal, toric, multifocal, and toric multifocal lenses and patient elected to proceed with monofocal. patient`s current mrx is myopic. target after surgery will be plano. The patient was told to continue all medications through surgery.  anesthesia will planned to be mac with topical. will combine with abic

## 2024-01-27 ENCOUNTER — DOCUMENTATION (OUTPATIENT)
Dept: OPHTHALMOLOGY | Facility: CLINIC | Age: 74
End: 2024-01-27
Payer: MEDICARE

## 2024-01-27 ASSESSMENT — SLIT LAMP EXAM - LIDS
COMMENTS: NORMAL
COMMENTS: NORMAL

## 2024-01-27 ASSESSMENT — PACHYMETRY
OD_CT(UM): 588
OS_CT(UM): 576

## 2024-01-27 ASSESSMENT — VISUAL ACUITY
METHOD: SNELLEN - LINEAR
OD_SC+: +1
OD_SC: 20/30

## 2024-01-27 ASSESSMENT — TONOMETRY
OD_IOP_MMHG: 16
IOP_METHOD: GOLDMANN APPLANATION

## 2024-01-27 ASSESSMENT — EXTERNAL EXAM - LEFT EYE: OS_EXAM: NORMAL

## 2024-01-27 ASSESSMENT — EXTERNAL EXAM - RIGHT EYE: OD_EXAM: NORMAL

## 2024-01-27 ASSESSMENT — CUP TO DISC RATIO: OD_RATIO: 0.5

## 2024-01-27 NOTE — PROGRESS NOTES
POD5  s/p ce/iol/abic, right   Patient with symptomatic improvement and improved VA   Resolution of fibrin strands in the anterior chamber (AC)   No hypopyon in anterior chamber (AC) or on gonio today   patient is no longer tender at all  Still favor postprocedural inflammation from Abic/malyugin usage, less likely infectious etiology   Continue cyclopentolate bid (patient states pharmacy will have ready later today, drop of atropine administered in clinic)  continue to pred to q2h while awake   C/w moxi QID   Discontinue glaucoma drops   shield and activity restrictions.  Rtc with Dr. Sheehan as scheduled on Thursday, sooner as needed     Discussed with Dr. Sheehan who agrees with assessment and plan.     1. chronic angle closure glaucoma, mild stage, bilateral  - s/p LPI OU  -nerves with some cupping but no definitive VF defect OU  IOP higher today while on latan qhs oU  given below recommend combining with canaloplasty to reduce drop burden/improved IOP control    2. Cataract, nuclear sclerotic, both eyes  72 yo patient presents with visually significant cataract of left>right eye affecting activities of daily living including using her firearm. It is believed removal of the cataracts will improve vision adn thus qualuty of life. After discussing r/b/a to surgery the patient elected to proceed left eye first hten right. Lens options were discussed including pros/cons/and eligibility for monofocal, toric, multifocal, and toric multifocal lenses and patient elected to proceed with monofocal. patient`s current mrx is myopic. target after surgery will be plano. The patient was told to continue all medications through surgery.  anesthesia will planned to be mac with topical. will combine with abic

## 2024-02-01 ENCOUNTER — OFFICE VISIT (OUTPATIENT)
Dept: OPHTHALMOLOGY | Facility: CLINIC | Age: 74
End: 2024-02-01
Payer: MEDICARE

## 2024-02-01 DIAGNOSIS — Z96.1 PSEUDOPHAKIA: Primary | ICD-10-CM

## 2024-02-01 PROCEDURE — 99024 POSTOP FOLLOW-UP VISIT: CPT | Performed by: OPHTHALMOLOGY

## 2024-02-01 ASSESSMENT — CONF VISUAL FIELD
OS_NORMAL: 1
OD_INFERIOR_TEMPORAL_RESTRICTION: 0
OD_INFERIOR_NASAL_RESTRICTION: 0
OD_SUPERIOR_NASAL_RESTRICTION: 0
OD_NORMAL: 1
OS_SUPERIOR_NASAL_RESTRICTION: 0
OS_INFERIOR_NASAL_RESTRICTION: 0
OS_SUPERIOR_TEMPORAL_RESTRICTION: 0
OS_INFERIOR_TEMPORAL_RESTRICTION: 0
OD_SUPERIOR_TEMPORAL_RESTRICTION: 0

## 2024-02-01 ASSESSMENT — ENCOUNTER SYMPTOMS
MUSCULOSKELETAL NEGATIVE: 0
ENDOCRINE NEGATIVE: 0
RESPIRATORY NEGATIVE: 0
ALLERGIC/IMMUNOLOGIC NEGATIVE: 0
CARDIOVASCULAR NEGATIVE: 0
PSYCHIATRIC NEGATIVE: 0
GASTROINTESTINAL NEGATIVE: 0
NEUROLOGICAL NEGATIVE: 0
EYES NEGATIVE: 0
HEMATOLOGIC/LYMPHATIC NEGATIVE: 0
CONSTITUTIONAL NEGATIVE: 0

## 2024-02-01 ASSESSMENT — EXTERNAL EXAM - RIGHT EYE: OD_EXAM: NORMAL

## 2024-02-01 ASSESSMENT — TONOMETRY
OS_IOP_MMHG: 26
IOP_METHOD: GOLDMANN APPLANATION
OD_IOP_MMHG: 20

## 2024-02-01 ASSESSMENT — PACHYMETRY
OS_CT(UM): 576
OD_CT(UM): 588

## 2024-02-01 ASSESSMENT — EXTERNAL EXAM - LEFT EYE: OS_EXAM: NORMAL

## 2024-02-01 ASSESSMENT — SLIT LAMP EXAM - LIDS
COMMENTS: NORMAL
COMMENTS: NORMAL

## 2024-02-01 ASSESSMENT — VISUAL ACUITY
OD_SC+: -2
OD_SC: 20/60
METHOD: SNELLEN - LINEAR
OD_PH_SC: 20/60

## 2024-02-01 NOTE — PROGRESS NOTES
POw1 s/p ce/iol/abic, right   Post op inflammation now resolved  But persistent K edema, will monitor  Stop cyclopentolate bid (patient states pharmacy will have ready later today, another drop of atropine administered in clinic)  Decrease pred to q3-4h while awake   C/w moxi QID until bottle finishes  Discontinue glaucoma drops OD but restart OS  discontinue shield and activity restrictions.  Rtc 2 weeks for IOP check, MRX, OCT MAC, k suture removal OS        1. chronic angle closure glaucoma, mild stage, bilateral  - s/p LPI OU  -nerves with some cupping but no definitive VF defect OU  IOP higher today while on latan qhs oU  given below recommend combining with canaloplasty to reduce drop burden/improved IOP control    2. Cataract, nuclear sclerotic, both eyes  72 yo patient presents with visually significant cataract of left>right eye affecting activities of daily living including using her firearm. It is believed removal of the cataracts will improve vision adn thus qualuty of life. After discussing r/b/a to surgery the patient elected to proceed left eye first hten right. Lens options were discussed including pros/cons/and eligibility for monofocal, toric, multifocal, and toric multifocal lenses and patient elected to proceed with monofocal. patient`s current mrx is myopic. target after surgery will be plano. The patient was told to continue all medications through surgery.  anesthesia will planned to be mac with topical. will combine with abic

## 2024-02-23 ENCOUNTER — APPOINTMENT (OUTPATIENT)
Dept: OPHTHALMOLOGY | Facility: CLINIC | Age: 74
End: 2024-02-23
Payer: MEDICARE

## 2024-02-27 ENCOUNTER — OFFICE VISIT (OUTPATIENT)
Dept: OPHTHALMOLOGY | Facility: CLINIC | Age: 74
End: 2024-02-27
Payer: MEDICARE

## 2024-02-27 DIAGNOSIS — H40.1131 PRIMARY OPEN-ANGLE GLAUCOMA, BILATERAL, MILD STAGE: Primary | ICD-10-CM

## 2024-02-27 PROCEDURE — 92134 CPTRZ OPH DX IMG PST SGM RTA: CPT | Mod: BILATERAL PROCEDURE | Performed by: OPHTHALMOLOGY

## 2024-02-27 PROCEDURE — 99024 POSTOP FOLLOW-UP VISIT: CPT | Performed by: OPHTHALMOLOGY

## 2024-02-27 ASSESSMENT — ENCOUNTER SYMPTOMS
GASTROINTESTINAL NEGATIVE: 0
EYES NEGATIVE: 0
MUSCULOSKELETAL NEGATIVE: 0
RESPIRATORY NEGATIVE: 0
CONSTITUTIONAL NEGATIVE: 0
ENDOCRINE NEGATIVE: 0
ALLERGIC/IMMUNOLOGIC NEGATIVE: 0
PSYCHIATRIC NEGATIVE: 0
HEMATOLOGIC/LYMPHATIC NEGATIVE: 0
CARDIOVASCULAR NEGATIVE: 0
NEUROLOGICAL NEGATIVE: 0

## 2024-02-27 ASSESSMENT — PACHYMETRY
OD_CT(UM): 588
OS_CT(UM): 576

## 2024-02-27 ASSESSMENT — REFRACTION_WEARINGRX
OS_CYLINDER: -1.00
OD_SPHERE: -4.50
OD_CYLINDER: -1.00
OS_ADD: +2.75
OD_ADD: +2.75
OD_AXIS: 096
OS_SPHERE: -5.50
OS_AXIS: 100

## 2024-02-27 ASSESSMENT — VISUAL ACUITY
OS_SC: 20/20
OD_SC+: +1
METHOD: SNELLEN - LINEAR
OS_SC+: -2
OD_SC: 20/25

## 2024-02-27 ASSESSMENT — REFRACTION_MANIFEST
OD_ADD: +2.50
OS_ADD: +2.50
OD_SPHERE: PLANO
OD_AXIS: 105
OS_SPHERE: -0.75
OD_CYLINDER: +0.75

## 2024-02-27 ASSESSMENT — TONOMETRY
OD_IOP_MMHG: 19
OS_IOP_MMHG: 21
IOP_METHOD: GOLDMANN APPLANATION

## 2024-02-27 ASSESSMENT — EXTERNAL EXAM - RIGHT EYE: OD_EXAM: NORMAL

## 2024-02-27 ASSESSMENT — CONF VISUAL FIELD
OD_SUPERIOR_NASAL_RESTRICTION: 0
OD_INFERIOR_NASAL_RESTRICTION: 0
OD_SUPERIOR_TEMPORAL_RESTRICTION: 0
OS_SUPERIOR_NASAL_RESTRICTION: 0
OS_INFERIOR_NASAL_RESTRICTION: 0
OD_INFERIOR_TEMPORAL_RESTRICTION: 0
OS_SUPERIOR_TEMPORAL_RESTRICTION: 0
OS_NORMAL: 1
OD_NORMAL: 1
OS_INFERIOR_TEMPORAL_RESTRICTION: 0

## 2024-02-27 ASSESSMENT — CUP TO DISC RATIO
OD_RATIO: 0.5
OS_RATIO: 0.75

## 2024-02-27 ASSESSMENT — EXTERNAL EXAM - LEFT EYE: OS_EXAM: NORMAL

## 2024-02-27 ASSESSMENT — SLIT LAMP EXAM - LIDS
COMMENTS: NORMAL
COMMENTS: NORMAL

## 2024-02-27 NOTE — PROGRESS NOTES
POM1 s/p ce/iol/abic, right   POM3 s/p CEIOL/ABIC left  Post op inflammation now resolved  K suture removed under betadine cover  Off prednisolone  Discontinue glaucoma drops OD but continue OS  Mrx dispensed  Oct mac with erm OD>OS with trace cme OD, monitor  Rtc 3 months for IOP check, soone rpNR      1. chronic angle closure glaucoma, mild stage, bilateral  - s/p LPI OU  -nerves with some cupping but no definitive VF defect OU  IOP higher today while on latan qhs oU  given below recommend combining with canaloplasty to reduce drop burden/improved IOP control    2. Cataract, nuclear sclerotic, both eyes  74 yo patient presents with visually significant cataract of left>right eye affecting activities of daily living including using her firearm. It is believed removal of the cataracts will improve vision adn thus qualuty of life. After discussing r/b/a to surgery the patient elected to proceed left eye first hten right. Lens options were discussed including pros/cons/and eligibility for monofocal, toric, multifocal, and toric multifocal lenses and patient elected to proceed with monofocal. patient`s current mrx is myopic. target after surgery will be plano. The patient was told to continue all medications through surgery.  anesthesia will planned to be mac with topical. will combine with abic

## 2024-03-22 ENCOUNTER — APPOINTMENT (OUTPATIENT)
Dept: OPHTHALMOLOGY | Facility: CLINIC | Age: 74
End: 2024-03-22
Payer: MEDICARE

## 2024-05-28 ENCOUNTER — OFFICE VISIT (OUTPATIENT)
Dept: OPHTHALMOLOGY | Facility: CLINIC | Age: 74
End: 2024-05-28
Payer: MEDICARE

## 2024-05-28 DIAGNOSIS — H40.1131 PRIMARY OPEN-ANGLE GLAUCOMA, BILATERAL, MILD STAGE: Primary | ICD-10-CM

## 2024-05-28 PROCEDURE — 99214 OFFICE O/P EST MOD 30 MIN: CPT | Performed by: OPHTHALMOLOGY

## 2024-05-28 ASSESSMENT — ENCOUNTER SYMPTOMS
ALLERGIC/IMMUNOLOGIC NEGATIVE: 0
GASTROINTESTINAL NEGATIVE: 0
MUSCULOSKELETAL NEGATIVE: 0
CONSTITUTIONAL NEGATIVE: 0
PSYCHIATRIC NEGATIVE: 0
CARDIOVASCULAR NEGATIVE: 0
RESPIRATORY NEGATIVE: 0
ENDOCRINE NEGATIVE: 0
HEMATOLOGIC/LYMPHATIC NEGATIVE: 0
NEUROLOGICAL NEGATIVE: 0
EYES NEGATIVE: 1

## 2024-05-28 ASSESSMENT — TONOMETRY
OS_IOP_MMHG: 16
OD_IOP_MMHG: 15
IOP_METHOD: GOLDMANN APPLANATION

## 2024-05-28 ASSESSMENT — SLIT LAMP EXAM - LIDS
COMMENTS: NORMAL
COMMENTS: NORMAL

## 2024-05-28 ASSESSMENT — VISUAL ACUITY
METHOD: SNELLEN - LINEAR
OS_SC: 20/20-2
OD_SC: 20/20-1

## 2024-05-28 ASSESSMENT — PACHYMETRY
OS_CT(UM): 576
OD_CT(UM): 588

## 2024-05-28 ASSESSMENT — CUP TO DISC RATIO
OS_RATIO: 0.75
OD_RATIO: 0.5

## 2024-05-28 ASSESSMENT — EXTERNAL EXAM - RIGHT EYE: OD_EXAM: NORMAL

## 2024-05-28 ASSESSMENT — EXTERNAL EXAM - LEFT EYE: OS_EXAM: NORMAL

## 2024-05-28 NOTE — PROGRESS NOTES
1. chronic angle closure glaucoma, mild stage, bilateral  - s/p LPI OU  -nerves with some cupping but no definitive VF defect OU  -S/P phaco abic OU  -IOP great today OU  -d/w patient option of trial off latanoprost OS but she wishes to continue for now  -rtc 6 months for hVF24-2 (note new mrx), DFE, OCT RNFL/OCT MAC

## 2024-09-11 ENCOUNTER — OFFICE VISIT (OUTPATIENT)
Dept: OPHTHALMOLOGY | Facility: CLINIC | Age: 74
End: 2024-09-11
Payer: MEDICARE

## 2024-09-11 DIAGNOSIS — H52.7 REFRACTIVE ERROR: Primary | ICD-10-CM

## 2024-09-11 PROCEDURE — 92015 DETERMINE REFRACTIVE STATE: CPT | Performed by: OPHTHALMOLOGY

## 2024-09-11 PROCEDURE — 99212 OFFICE O/P EST SF 10 MIN: CPT | Performed by: OPHTHALMOLOGY

## 2024-09-11 ASSESSMENT — EXTERNAL EXAM - RIGHT EYE: OD_EXAM: NORMAL

## 2024-09-11 ASSESSMENT — REFRACTION_MANIFEST
OD_AXIS: 120
METHOD_AUTOREFRACTION: 1
OD_CYLINDER: +0.75
OS_SPHERE: -0.75
OS_AXIS: 110
OD_CYLINDER: +0.50
OS_CYLINDER: +0.25
OD_ADD: +2.75
OS_AXIS: 097
OS_CYLINDER: +0.25
OS_SPHERE: -0.75
OD_SPHERE: -0.25
OD_AXIS: 110
OS_ADD: +2.75
OD_SPHERE: -0.25

## 2024-09-11 ASSESSMENT — PACHYMETRY
OD_CT(UM): 588
OS_CT(UM): 576

## 2024-09-11 ASSESSMENT — CONF VISUAL FIELD
OD_SUPERIOR_NASAL_RESTRICTION: 0
OD_SUPERIOR_TEMPORAL_RESTRICTION: 0
OS_SUPERIOR_NASAL_RESTRICTION: 0
OS_INFERIOR_TEMPORAL_RESTRICTION: 0
OS_NORMAL: 1
OD_INFERIOR_NASAL_RESTRICTION: 0
OD_INFERIOR_TEMPORAL_RESTRICTION: 0
OS_SUPERIOR_TEMPORAL_RESTRICTION: 0
OD_NORMAL: 1
OS_INFERIOR_NASAL_RESTRICTION: 0

## 2024-09-11 ASSESSMENT — TONOMETRY
OS_IOP_MMHG: 16
OD_IOP_MMHG: 15
OD_IOP_MMHG: 18
IOP_METHOD: TONOPEN
OS_IOP_MMHG: 18
IOP_METHOD: GOLDMANN APPLANATION

## 2024-09-11 ASSESSMENT — VISUAL ACUITY
OS_CC: 20/150
METHOD: SNELLEN - LINEAR
OS_SC: 20/25
OD_CC: 20/150
OD_SC: 20/20
OS_SC+: +2

## 2024-09-11 ASSESSMENT — REFRACTION_WEARINGRX
OS_SPHERE: -0.75
OD_CYLINDER: +0.75
OD_SPHERE: PLANO
OD_AXIS: 105
OS_ADD: +2.50
OD_ADD: +2.50

## 2024-09-11 ASSESSMENT — SLIT LAMP EXAM - LIDS
COMMENTS: NORMAL
COMMENTS: NORMAL

## 2024-09-11 ASSESSMENT — EXTERNAL EXAM - LEFT EYE: OS_EXAM: NORMAL

## 2024-09-11 ASSESSMENT — CUP TO DISC RATIO
OD_RATIO: 0.5
OS_RATIO: 0.75

## 2024-09-11 NOTE — PROGRESS NOTES
9/11/2024 CC: 74 y.o. c/o infrequent burning OS. EDSON Sheehan    Past ocular history: POAG, Pseudophakia OU   Family history: Glaucoma in mother  Past medical history: HTN, Asthma  Social history: denies tobacco     Eye medications:   Both eyes: PFATs    Allergy:  Aspirin    Testing:    None    Assessment:   Chronic angle closure glaucoma, mild stage, bilateral  - s/p LPI OU  -nerves with some cupping but no definitive VF defect OU  -S/P phaco abic OU  -IOP at target 5/28/2024  -d/w patient option of trial off latanoprost OS but she wishes to continue for now  -rtc 6 months for hVF24-2 (note new mrx), DFE, OCT RNFL/OCT MAC    9/11/2024 :  IOP 15/16, mild dryness OU. She is not comfortable w/ her current Wrx. Mrx today, likely OTC readers +2.75.    Plan:   I explained my findings. Mild JORGE, Mrx.    Eye medications:   Both eyes: PFATS    RTC as scheduled w/ Dr Sheehan

## 2024-10-09 ENCOUNTER — APPOINTMENT (OUTPATIENT)
Dept: OPHTHALMOLOGY | Facility: CLINIC | Age: 74
End: 2024-10-09
Payer: MEDICARE

## 2024-11-05 DIAGNOSIS — H40.1131 PRIMARY OPEN-ANGLE GLAUCOMA, BILATERAL, MILD STAGE: Primary | ICD-10-CM

## 2024-11-05 RX ORDER — LATANOPROST 50 UG/ML
1 SOLUTION/ DROPS OPHTHALMIC NIGHTLY
COMMUNITY
End: 2024-11-05 | Stop reason: SDUPTHER

## 2024-11-05 RX ORDER — LATANOPROST 50 UG/ML
1 SOLUTION/ DROPS OPHTHALMIC NIGHTLY
Qty: 2.5 ML | Refills: 11 | Status: SHIPPED | OUTPATIENT
Start: 2024-11-05

## 2025-01-16 ENCOUNTER — APPOINTMENT (OUTPATIENT)
Dept: OPHTHALMOLOGY | Facility: CLINIC | Age: 75
End: 2025-01-16
Payer: OTHER MISCELLANEOUS

## 2025-03-06 ENCOUNTER — APPOINTMENT (OUTPATIENT)
Dept: OPHTHALMOLOGY | Facility: CLINIC | Age: 75
End: 2025-03-06
Payer: MEDICARE

## 2025-04-03 ENCOUNTER — APPOINTMENT (OUTPATIENT)
Dept: OPHTHALMOLOGY | Facility: CLINIC | Age: 75
End: 2025-04-03
Payer: OTHER MISCELLANEOUS

## 2025-04-15 ENCOUNTER — APPOINTMENT (OUTPATIENT)
Dept: OPHTHALMOLOGY | Facility: CLINIC | Age: 75
End: 2025-04-15
Payer: MEDICARE

## 2025-04-15 DIAGNOSIS — H52.223 REGULAR ASTIGMATISM OF BOTH EYES WITH PRESBYOPIA: ICD-10-CM

## 2025-04-15 DIAGNOSIS — H52.4 REGULAR ASTIGMATISM OF BOTH EYES WITH PRESBYOPIA: ICD-10-CM

## 2025-04-15 DIAGNOSIS — H26.493 PCO (POSTERIOR CAPSULAR OPACIFICATION), BILATERAL: Primary | ICD-10-CM

## 2025-04-15 DIAGNOSIS — Z96.1 PSEUDOPHAKIA OF BOTH EYES: ICD-10-CM

## 2025-04-15 DIAGNOSIS — H35.373 EPIRETINAL MEMBRANE (ERM), BILATERAL: ICD-10-CM

## 2025-04-15 PROBLEM — H25.13 CATARACT, NUCLEAR SCLEROTIC, BOTH EYES: Status: RESOLVED | Noted: 2023-08-31 | Resolved: 2025-04-15

## 2025-04-15 PROBLEM — H25.13 AGE-RELATED NUCLEAR CATARACT OF BOTH EYES: Status: RESOLVED | Noted: 2023-10-30 | Resolved: 2025-04-15

## 2025-04-15 PROCEDURE — 92012 INTRM OPH EXAM EST PATIENT: CPT | Performed by: STUDENT IN AN ORGANIZED HEALTH CARE EDUCATION/TRAINING PROGRAM

## 2025-04-15 PROCEDURE — 92015 DETERMINE REFRACTIVE STATE: CPT | Performed by: STUDENT IN AN ORGANIZED HEALTH CARE EDUCATION/TRAINING PROGRAM

## 2025-04-15 ASSESSMENT — CONF VISUAL FIELD
OD_SUPERIOR_NASAL_RESTRICTION: 0
OD_INFERIOR_NASAL_RESTRICTION: 0
OD_SUPERIOR_TEMPORAL_RESTRICTION: 0
OS_SUPERIOR_TEMPORAL_RESTRICTION: 0
METHOD: COUNTING FINGERS
OS_SUPERIOR_NASAL_RESTRICTION: 0
OS_INFERIOR_NASAL_RESTRICTION: 0
OS_NORMAL: 1
OD_NORMAL: 1
OD_INFERIOR_TEMPORAL_RESTRICTION: 0
OS_INFERIOR_TEMPORAL_RESTRICTION: 0

## 2025-04-15 ASSESSMENT — ENCOUNTER SYMPTOMS
ALLERGIC/IMMUNOLOGIC NEGATIVE: 0
NEUROLOGICAL NEGATIVE: 0
CONSTITUTIONAL NEGATIVE: 0
PSYCHIATRIC NEGATIVE: 0
ENDOCRINE NEGATIVE: 0
HEMATOLOGIC/LYMPHATIC NEGATIVE: 0
GASTROINTESTINAL NEGATIVE: 0
EYES NEGATIVE: 0
CARDIOVASCULAR NEGATIVE: 0
MUSCULOSKELETAL NEGATIVE: 0
RESPIRATORY NEGATIVE: 0

## 2025-04-15 ASSESSMENT — PACHYMETRY
OS_CT(UM): 576
OD_CT(UM): 588

## 2025-04-15 ASSESSMENT — SLIT LAMP EXAM - LIDS
COMMENTS: NORMAL
COMMENTS: NORMAL

## 2025-04-15 ASSESSMENT — REFRACTION_MANIFEST
OD_CYLINDER: +0.50
OD_SPHERE: -0.25
METHOD_AUTOREFRACTION: 1
OD_SPHERE: PLANO
OS_SPHERE: -1.25
OS_AXIS: 071
OD_AXIS: 030
OD_ADD: +2.50
OD_AXIS: 141
OS_SPHERE: PLANO
OS_CYLINDER: -1.25
OD_CYLINDER: -0.25
OS_AXIS: 047
OS_ADD: +2.50
OS_CYLINDER: +0.75

## 2025-04-15 ASSESSMENT — REFRACTION_WEARINGRX
OD_AXIS: 105
OD_ADD: +2.50
OD_SPHERE: PLANO
OS_CYLINDER: SPHERE
OS_ADD: +2.50
OS_SPHERE: -0.75
OD_CYLINDER: +0.75

## 2025-04-15 ASSESSMENT — EXTERNAL EXAM - LEFT EYE: OS_EXAM: NORMAL

## 2025-04-15 ASSESSMENT — VISUAL ACUITY
OS_SC: 20/20-2
METHOD: SNELLEN - LINEAR
OD_SC: 20/20

## 2025-04-15 ASSESSMENT — CUP TO DISC RATIO
OD_RATIO: 0.5
OS_RATIO: 0.75

## 2025-04-15 ASSESSMENT — TONOMETRY
OD_IOP_MMHG: 14
OS_IOP_MMHG: 16
IOP_METHOD: GOLDMANN APPLANATION

## 2025-04-15 ASSESSMENT — EXTERNAL EXAM - RIGHT EYE: OD_EXAM: NORMAL

## 2025-04-15 NOTE — PROGRESS NOTES
Assessment/Plan   Diagnoses and all orders for this visit:  PCO (posterior capsular opacification), bilateral  -mild non visually significant  Epiretinal membrane (ERM), bilateral  -mild non visually significant   Regular astigmatism of both eyes with presbyopia  Pseudophakia of both eyes  -updated spec RX today  -accepted on trial frame with BCVA 20/20 OD+OS  -patient needs vision for distance/computer/desk work for job-can continue with PAL  Chronic angle closure glaucoma, mild stage, bilateral  - s/p LPI OU  - C/D OS>OD but no definitive VF defect OU on previous testing  - S/P phaco abic OU  - IOP 14/16      continue care with Dr. Sheehan/[place] as planned

## 2025-05-08 ENCOUNTER — APPOINTMENT (OUTPATIENT)
Dept: OPHTHALMOLOGY | Facility: CLINIC | Age: 75
End: 2025-05-08
Payer: OTHER MISCELLANEOUS

## 2025-05-08 DIAGNOSIS — H40.1131 PRIMARY OPEN ANGLE GLAUCOMA (POAG) OF BOTH EYES, MILD STAGE: Primary | ICD-10-CM

## 2025-05-08 PROCEDURE — 92083 EXTENDED VISUAL FIELD XM: CPT | Performed by: OPHTHALMOLOGY

## 2025-05-08 PROCEDURE — G2211 COMPLEX E/M VISIT ADD ON: HCPCS | Performed by: OPHTHALMOLOGY

## 2025-05-08 PROCEDURE — 99214 OFFICE O/P EST MOD 30 MIN: CPT | Performed by: OPHTHALMOLOGY

## 2025-05-08 PROCEDURE — 92134 CPTRZ OPH DX IMG PST SGM RTA: CPT | Performed by: OPHTHALMOLOGY

## 2025-05-08 ASSESSMENT — EXTERNAL EXAM - LEFT EYE: OS_EXAM: NORMAL

## 2025-05-08 ASSESSMENT — VISUAL ACUITY
OD_SC: 20/20
METHOD: SNELLEN - LINEAR
OD_SC+: -2
OS_SC: 20/20

## 2025-05-08 ASSESSMENT — PACHYMETRY
OS_CT(UM): 576
OD_CT(UM): 588

## 2025-05-08 ASSESSMENT — SLIT LAMP EXAM - LIDS
COMMENTS: NORMAL
COMMENTS: NORMAL

## 2025-05-08 ASSESSMENT — REFRACTION_WEARINGRX
OS_ADD: +2.50
OD_CYLINDER: -0.25
OS_AXIS: 047
OD_ADD: +2.50
OD_SPHERE: PLANO
OD_AXIS: 030
OS_CYLINDER: -1.25
OS_SPHERE: PLANO

## 2025-05-08 ASSESSMENT — TONOMETRY
IOP_METHOD: GOLDMANN APPLANATION
OD_IOP_MMHG: 25
OS_IOP_MMHG: 25

## 2025-05-08 ASSESSMENT — CUP TO DISC RATIO
OS_RATIO: 0.75
OD_RATIO: 0.5

## 2025-05-08 ASSESSMENT — EXTERNAL EXAM - RIGHT EYE: OD_EXAM: NORMAL

## 2025-05-08 NOTE — PROGRESS NOTES
1. chronic angle closure glaucoma, mild stage, bilateral  - s/p LPI OU  OCT, Optic Nerve - OU - Both Eyes          Robust OD, moderate thinning OS  Both stable/improved form previous         OCT, Retina - OU - Both Eyes          Right Eye  Findings include no diabetic retinopathy.     Left Eye  Findings include no diabetic retinopathy.     Notes  Erm OU, preserved foveal contour OU         Pichardo Visual Field - OU - Both Eyes          Nsd OS>OD  Inferior arcuate OS, correlates with oct, stable from prior          -S/P phaco abic OU  -IOP goal <21  -IOP is up today, patient reports good compliance, given stable testing will follow closely  -rtc 2-3 months IOP check/gonio

## 2025-05-15 ENCOUNTER — APPOINTMENT (OUTPATIENT)
Dept: OPHTHALMOLOGY | Facility: CLINIC | Age: 75
End: 2025-05-15
Payer: OTHER MISCELLANEOUS

## 2025-07-10 ENCOUNTER — APPOINTMENT (OUTPATIENT)
Dept: OPHTHALMOLOGY | Facility: CLINIC | Age: 75
End: 2025-07-10
Payer: MEDICARE

## 2025-07-10 DIAGNOSIS — H40.2231 CHRONIC ANGLE-CLOSURE GLAUCOMA OF BOTH EYES, MILD STAGE: ICD-10-CM

## 2025-07-10 DIAGNOSIS — H26.493 PCO (POSTERIOR CAPSULAR OPACIFICATION), BILATERAL: Primary | ICD-10-CM

## 2025-07-10 PROCEDURE — 99213 OFFICE O/P EST LOW 20 MIN: CPT | Performed by: OPHTHALMOLOGY

## 2025-07-10 PROCEDURE — 92020 GONIOSCOPY: CPT | Performed by: OPHTHALMOLOGY

## 2025-07-10 PROCEDURE — G2211 COMPLEX E/M VISIT ADD ON: HCPCS | Performed by: OPHTHALMOLOGY

## 2025-07-10 RX ORDER — LATANOPROST 50 UG/ML
1 SOLUTION/ DROPS OPHTHALMIC NIGHTLY
Qty: 7.5 ML | Refills: 3 | Status: SHIPPED | OUTPATIENT
Start: 2025-07-10

## 2025-07-10 RX ORDER — LATANOPROST 50 UG/ML
1 SOLUTION/ DROPS OPHTHALMIC NIGHTLY
Qty: 7.5 ML | Refills: 3 | Status: SHIPPED | OUTPATIENT
Start: 2025-07-10 | End: 2025-07-10 | Stop reason: SDUPTHER

## 2025-07-10 ASSESSMENT — VISUAL ACUITY
METHOD: SNELLEN - LINEAR
OD_SC: 20/20
OS_SC: 20/20

## 2025-07-10 ASSESSMENT — EXTERNAL EXAM - RIGHT EYE: OD_EXAM: NORMAL

## 2025-07-10 ASSESSMENT — TONOMETRY
OD_IOP_MMHG: 21
OS_IOP_MMHG: 21
IOP_METHOD: GOLDMANN APPLANATION

## 2025-07-10 ASSESSMENT — PACHYMETRY
OD_CT(UM): 588
OS_CT(UM): 576

## 2025-07-10 ASSESSMENT — SLIT LAMP EXAM - LIDS
COMMENTS: NORMAL
COMMENTS: NORMAL

## 2025-07-10 ASSESSMENT — GONIOSCOPY
OD_TEMPORAL: C40F 2+
OS_INFERIOR: C40F 2+
OD_INFERIOR: C40F 2+
OS_SUPERIOR: C40F 2+
OS_NASAL: C40F 2+
OD_SUPERIOR: C40F 2+
OD_NASAL: C40F 2+
OS_TEMPORAL: C40F 2+

## 2025-07-10 ASSESSMENT — EXTERNAL EXAM - LEFT EYE: OS_EXAM: NORMAL

## 2025-07-10 NOTE — PROGRESS NOTES
1. chronic angle closure glaucoma, mild stage, bilateral  - s/p LPI OU  OCT, Optic Nerve - OU - Both Eyes          Robust OD, moderate thinning OS  Both stable/improved form previous         OCT, Retina - OU - Both Eyes          Right Eye  Findings include no diabetic retinopathy.     Left Eye  Findings include no diabetic retinopathy.     Notes  Erm OU, preserved foveal contour OU         Pichardo Visual Field - OU - Both Eyes          Nsd OS>OD  Inferior arcuate OS, correlates with oct, stable from prior          -S/P phaco abic OU  -IOP goal <21  -continue latanoprost qhs OU  -rtc 2-3 months IOP check/gonio

## 2026-01-08 ENCOUNTER — APPOINTMENT (OUTPATIENT)
Dept: OPHTHALMOLOGY | Facility: CLINIC | Age: 76
End: 2026-01-08
Payer: MEDICARE

## (undated) DEVICE — CANNULA, VISTEC, ANTERIOR CHAMBER, RYCROFT, ANGLED, 30 G X 7/8 IN

## (undated) DEVICE — CANNULA, CHANG HYDRO- DISSECT, 27G, FLAT TIP, DISP

## (undated) DEVICE — ITRACK ADVANCE

## (undated) DEVICE — COUNTER, NEEDLE, FOAM BLOCK, W/MAGNET, W/BLADE GUARD, 10 COUNT, RED, LF

## (undated) DEVICE — HANDLE, MST, F/MST TIPS

## (undated) DEVICE — ERASER, HEMOSTATIC, WET-FIELD, BIPOLAR, 18 G

## (undated) DEVICE — CANNULA, 27G X 22MM, ANTERIOR, CHAMBER, RYCROFT

## (undated) DEVICE — TIP, INTREPID 0.3MM, I/A COAXIAL CONICAL, STRAIGHT

## (undated) DEVICE — NEEDLE, HYPODERMIC, REGULAR WALL, REGULAR BEVEL, 30 G X 0.5 IN

## (undated) DEVICE — SPEAR, EYE, SURGICAL, WECK-CELL, CELLULOSE

## (undated) DEVICE — Device

## (undated) DEVICE — SOLUTION, BSS IRRIGATING 500ML BAG

## (undated) DEVICE — SYRINGE, 1 CC, LUER LOCK

## (undated) DEVICE — DRAPE, UTILITY, INSTRUMENT PANEL, 46CM X 56CM (18X22"

## (undated) DEVICE — GOWN, ASTOUND, L

## (undated) DEVICE — SOLUTION, IRRIGATION, USP, STERILE WATER, 250ML, BOTTLE

## (undated) DEVICE — SOLUTION, IRRIGATING, INTRAOCULAR, BSS PLUS, 500ML

## (undated) DEVICE — SUTURE, ETHILON, 10-0, 12 IN, TG160-6, BLACK

## (undated) DEVICE — GLOVE, SURGICAL, PROTEXIS PI , 6.5, PF, LF

## (undated) DEVICE — SUTURE, VICRYL, 8-0, 12 IN, TG1406, DA, VIOLET

## (undated) DEVICE — SOLUTION, BSS IRRIGATING 15ML

## (undated) DEVICE — CORD, CAUTERY, BIPOLAR, STERILE

## (undated) DEVICE — REST, HEAD, BAGEL, 9 IN

## (undated) DEVICE — DELIVERY SYSTEM, IOL, MONARCH II, HANDPIECE, NS, REUSABLE

## (undated) DEVICE — RING, MALYUGIN MST 6.25MM

## (undated) DEVICE — SUTURE, VICRYL, 7-0, 18 IN, TG1608, DA, VIOLET

## (undated) DEVICE — CANNULA, ANTERIOR CHAMBER